# Patient Record
Sex: FEMALE | Race: WHITE | NOT HISPANIC OR LATINO | Employment: PART TIME
[De-identification: names, ages, dates, MRNs, and addresses within clinical notes are randomized per-mention and may not be internally consistent; named-entity substitution may affect disease eponyms.]

---

## 2019-12-09 ENCOUNTER — HEALTH MAINTENANCE LETTER (OUTPATIENT)
Age: 24
End: 2019-12-09

## 2020-03-15 ENCOUNTER — HEALTH MAINTENANCE LETTER (OUTPATIENT)
Age: 25
End: 2020-03-15

## 2020-08-28 LAB
C TRACH DNA SPEC QL PROBE+SIG AMP: NEGATIVE
N GONORRHOEA DNA SPEC QL PROBE+SIG AMP: NEGATIVE

## 2020-10-22 LAB
HIV 1+2 AB+HIV1 P24 AG SERPL QL IA: NEGATIVE
RUBELLA ABY IGG: NORMAL
RUBELLA ANTIBODY IGG QUANTITATIVE: 12.2 IU/ML

## 2020-12-21 LAB — HBV SURFACE AG SERPL QL IA: NORMAL

## 2021-01-14 ENCOUNTER — HEALTH MAINTENANCE LETTER (OUTPATIENT)
Age: 26
End: 2021-01-14

## 2021-02-15 ENCOUNTER — APPOINTMENT (OUTPATIENT)
Dept: ULTRASOUND IMAGING | Facility: CLINIC | Age: 26
End: 2021-02-15
Attending: OBSTETRICS & GYNECOLOGY
Payer: COMMERCIAL

## 2021-02-15 ENCOUNTER — HOSPITAL ENCOUNTER (OUTPATIENT)
Facility: CLINIC | Age: 26
Discharge: HOME | End: 2021-02-15
Attending: OBSTETRICS & GYNECOLOGY | Admitting: OBSTETRICS & GYNECOLOGY
Payer: COMMERCIAL

## 2021-02-15 VITALS
SYSTOLIC BLOOD PRESSURE: 115 MMHG | DIASTOLIC BLOOD PRESSURE: 69 MMHG | HEART RATE: 85 BPM | RESPIRATION RATE: 16 BRPM | WEIGHT: 165 LBS | TEMPERATURE: 98.4 F | BODY MASS INDEX: 29.23 KG/M2 | HEIGHT: 63 IN

## 2021-02-15 DIAGNOSIS — N20.0 NEPHROLITHIASIS: Primary | ICD-10-CM

## 2021-02-15 PROBLEM — Z36.89 ENCOUNTER FOR TRIAGE IN PREGNANT PATIENT: Status: ACTIVE | Noted: 2021-02-15

## 2021-02-15 LAB
ALBUMIN UR-MCNC: 30 MG/DL
APPEARANCE UR: ABNORMAL
BILIRUB UR QL STRIP: NEGATIVE
COLOR UR AUTO: YELLOW
GLUCOSE UR STRIP-MCNC: NEGATIVE MG/DL
HGB UR QL STRIP: ABNORMAL
KETONES UR STRIP-MCNC: NEGATIVE MG/DL
LEUKOCYTE ESTERASE UR QL STRIP: NEGATIVE
MUCOUS THREADS #/AREA URNS LPF: PRESENT /LPF
NITRATE UR QL: NEGATIVE
PH UR STRIP: 6 PH (ref 5–7)
RBC #/AREA URNS AUTO: >182 /HPF (ref 0–2)
SOURCE: ABNORMAL
SP GR UR STRIP: 1.03 (ref 1–1.03)
SQUAMOUS #/AREA URNS AUTO: 5 /HPF (ref 0–1)
UROBILINOGEN UR STRIP-MCNC: NORMAL MG/DL (ref 0–2)
WBC #/AREA URNS AUTO: 5 /HPF (ref 0–5)

## 2021-02-15 PROCEDURE — G0463 HOSPITAL OUTPT CLINIC VISIT: HCPCS

## 2021-02-15 PROCEDURE — 96375 TX/PRO/DX INJ NEW DRUG ADDON: CPT

## 2021-02-15 PROCEDURE — 258N000003 HC RX IP 258 OP 636: Performed by: OBSTETRICS & GYNECOLOGY

## 2021-02-15 PROCEDURE — 96374 THER/PROPH/DIAG INJ IV PUSH: CPT

## 2021-02-15 PROCEDURE — 96360 HYDRATION IV INFUSION INIT: CPT

## 2021-02-15 PROCEDURE — 76770 US EXAM ABDO BACK WALL COMP: CPT

## 2021-02-15 PROCEDURE — 250N000011 HC RX IP 250 OP 636: Performed by: OBSTETRICS & GYNECOLOGY

## 2021-02-15 PROCEDURE — 81001 URINALYSIS AUTO W/SCOPE: CPT | Performed by: OBSTETRICS & GYNECOLOGY

## 2021-02-15 PROCEDURE — 96376 TX/PRO/DX INJ SAME DRUG ADON: CPT

## 2021-02-15 PROCEDURE — 250N000013 HC RX MED GY IP 250 OP 250 PS 637: Performed by: OBSTETRICS & GYNECOLOGY

## 2021-02-15 RX ORDER — PROCHLORPERAZINE 25 MG
25 SUPPOSITORY, RECTAL RECTAL EVERY 12 HOURS PRN
Qty: 14 SUPPOSITORY | Refills: 0 | Status: ON HOLD | OUTPATIENT
Start: 2021-02-15 | End: 2021-05-29

## 2021-02-15 RX ORDER — NALOXONE HYDROCHLORIDE 0.4 MG/ML
0.2 INJECTION, SOLUTION INTRAMUSCULAR; INTRAVENOUS; SUBCUTANEOUS
Status: DISCONTINUED | OUTPATIENT
Start: 2021-02-15 | End: 2021-02-15 | Stop reason: HOSPADM

## 2021-02-15 RX ORDER — NALOXONE HYDROCHLORIDE 0.4 MG/ML
0.4 INJECTION, SOLUTION INTRAMUSCULAR; INTRAVENOUS; SUBCUTANEOUS
Status: DISCONTINUED | OUTPATIENT
Start: 2021-02-15 | End: 2021-02-15 | Stop reason: HOSPADM

## 2021-02-15 RX ORDER — ACETAMINOPHEN 325 MG/1
650 TABLET ORAL EVERY 4 HOURS PRN
Status: DISCONTINUED | OUTPATIENT
Start: 2021-02-15 | End: 2021-02-15 | Stop reason: HOSPADM

## 2021-02-15 RX ORDER — VITAMIN A ACETATE, BETA CAROTENE, ASCORBIC ACID, CHOLECALCIFEROL, .ALPHA.-TOCOPHEROL ACETATE, DL-, THIAMINE MONONITRATE, RIBOFLAVIN, NIACINAMIDE, PYRIDOXINE HYDROCHLORIDE, FOLIC ACID, CYANOCOBALAMIN, CALCIUM CARBONATE, FERROUS FUMARATE, ZINC OXIDE, CUPRIC OXIDE 3080; 12; 120; 400; 1; 1.84; 3; 20; 22; 920; 25; 200; 27; 10; 2 [IU]/1; UG/1; MG/1; [IU]/1; MG/1; MG/1; MG/1; MG/1; MG/1; [IU]/1; MG/1; MG/1; MG/1; MG/1; MG/1
1 TABLET, FILM COATED ORAL DAILY
COMMUNITY

## 2021-02-15 RX ORDER — DIPHENHYDRAMINE HCL 25 MG
25 CAPSULE ORAL EVERY 6 HOURS PRN
Status: DISCONTINUED | OUTPATIENT
Start: 2021-02-15 | End: 2021-02-15 | Stop reason: HOSPADM

## 2021-02-15 RX ORDER — DULOXETIN HYDROCHLORIDE 60 MG/1
60 CAPSULE, DELAYED RELEASE ORAL DAILY
COMMUNITY
Start: 2021-01-21

## 2021-02-15 RX ORDER — OXYCODONE HYDROCHLORIDE 5 MG/1
5 TABLET ORAL EVERY 4 HOURS PRN
Qty: 5 TABLET | Refills: 0 | Status: ON HOLD | OUTPATIENT
Start: 2021-02-15 | End: 2021-02-17

## 2021-02-15 RX ORDER — PROMETHAZINE HYDROCHLORIDE 12.5 MG/1
25 TABLET ORAL EVERY 6 HOURS PRN
Qty: 14 TABLET | Refills: 0 | Status: ON HOLD | OUTPATIENT
Start: 2021-02-15 | End: 2021-05-29

## 2021-02-15 RX ORDER — HYDROMORPHONE HYDROCHLORIDE 1 MG/ML
0.2 INJECTION, SOLUTION INTRAMUSCULAR; INTRAVENOUS; SUBCUTANEOUS
Status: DISCONTINUED | OUTPATIENT
Start: 2021-02-15 | End: 2021-02-15 | Stop reason: HOSPADM

## 2021-02-15 RX ORDER — ONDANSETRON 4 MG/1
8 TABLET, FILM COATED ORAL EVERY 8 HOURS PRN
Qty: 30 TABLET | Refills: 0 | Status: ON HOLD | OUTPATIENT
Start: 2021-02-15 | End: 2021-05-29

## 2021-02-15 RX ORDER — ONDANSETRON 2 MG/ML
8 INJECTION INTRAMUSCULAR; INTRAVENOUS EVERY 8 HOURS PRN
Status: DISCONTINUED | OUTPATIENT
Start: 2021-02-15 | End: 2021-02-15 | Stop reason: HOSPADM

## 2021-02-15 RX ORDER — SODIUM CHLORIDE, SODIUM LACTATE, POTASSIUM CHLORIDE, CALCIUM CHLORIDE 600; 310; 30; 20 MG/100ML; MG/100ML; MG/100ML; MG/100ML
INJECTION, SOLUTION INTRAVENOUS CONTINUOUS
Status: DISCONTINUED | OUTPATIENT
Start: 2021-02-15 | End: 2021-02-15 | Stop reason: HOSPADM

## 2021-02-15 RX ORDER — DIPHENHYDRAMINE HCL 25 MG
25 CAPSULE ORAL EVERY 6 HOURS PRN
Qty: 60 CAPSULE | Refills: 0 | Status: ON HOLD | OUTPATIENT
Start: 2021-02-15 | End: 2021-05-29

## 2021-02-15 RX ORDER — DIPHENHYDRAMINE HYDROCHLORIDE 50 MG/ML
25 INJECTION INTRAMUSCULAR; INTRAVENOUS EVERY 6 HOURS PRN
Status: DISCONTINUED | OUTPATIENT
Start: 2021-02-15 | End: 2021-02-15 | Stop reason: HOSPADM

## 2021-02-15 RX ORDER — OXYCODONE HYDROCHLORIDE 5 MG/1
5 TABLET ORAL EVERY 4 HOURS PRN
Status: DISCONTINUED | OUTPATIENT
Start: 2021-02-15 | End: 2021-02-15 | Stop reason: HOSPADM

## 2021-02-15 RX ORDER — TAMSULOSIN HYDROCHLORIDE 0.4 MG/1
0.4 CAPSULE ORAL DAILY
Qty: 7 CAPSULE | Refills: 0 | Status: SHIPPED | OUTPATIENT
Start: 2021-02-15 | End: 2021-02-22

## 2021-02-15 RX ORDER — METOCLOPRAMIDE HYDROCHLORIDE 5 MG/ML
10 INJECTION INTRAMUSCULAR; INTRAVENOUS EVERY 6 HOURS PRN
Status: DISCONTINUED | OUTPATIENT
Start: 2021-02-15 | End: 2021-02-15 | Stop reason: HOSPADM

## 2021-02-15 RX ORDER — ACETAMINOPHEN 325 MG/1
650 TABLET ORAL EVERY 4 HOURS PRN
Qty: 30 TABLET | Refills: 0 | Status: ON HOLD | OUTPATIENT
Start: 2021-02-15 | End: 2021-05-29

## 2021-02-15 RX ADMIN — ONDANSETRON 8 MG: 2 INJECTION INTRAMUSCULAR; INTRAVENOUS at 05:51

## 2021-02-15 RX ADMIN — OXYCODONE HYDROCHLORIDE 5 MG: 5 TABLET ORAL at 08:32

## 2021-02-15 RX ADMIN — ACETAMINOPHEN 650 MG: 325 TABLET, FILM COATED ORAL at 08:32

## 2021-02-15 RX ADMIN — DIPHENHYDRAMINE HYDROCHLORIDE 25 MG: 25 CAPSULE ORAL at 08:32

## 2021-02-15 RX ADMIN — METOCLOPRAMIDE HYDROCHLORIDE 10 MG: 5 INJECTION INTRAMUSCULAR; INTRAVENOUS at 08:33

## 2021-02-15 RX ADMIN — SODIUM CHLORIDE, POTASSIUM CHLORIDE, SODIUM LACTATE AND CALCIUM CHLORIDE: 600; 310; 30; 20 INJECTION, SOLUTION INTRAVENOUS at 05:52

## 2021-02-15 RX ADMIN — HYDROMORPHONE HYDROCHLORIDE 0.5 MG: 1 INJECTION, SOLUTION INTRAMUSCULAR; INTRAVENOUS; SUBCUTANEOUS at 07:39

## 2021-02-15 RX ADMIN — HYDROMORPHONE HYDROCHLORIDE 0.2 MG: 1 INJECTION, SOLUTION INTRAMUSCULAR; INTRAVENOUS; SUBCUTANEOUS at 05:51

## 2021-02-15 ASSESSMENT — MIFFLIN-ST. JEOR: SCORE: 1462.57

## 2021-02-15 NOTE — H&P
"Triage Note    CC: left flank pain  HPI Source: chart and pt    HPI: This 25 year old  at 24w4d by US presenting to triage with cc of left flank pain that started last evening at around 2100 hrs, rated 6-7/10, radiating to her left groin area. Denies persona or family hx of kidney stones. Denies vaginal bleeding, abnormal discharge, LOF, contractions. States + FM.     During her triage evaluation she had a UA showing microscopic hematuria, no UTI, she had a renal ultrasound that showed the following:    IMPRESSION:  1.  Mild to moderate right-sided hydronephrosis.  2.  Mild left-sided hydronephrosis.  3.  Nonobstructing right renal stones.    She was given dilaudid and zofran with mild relief scoring her pain this morning at 5/10. Her pain regimen was modified to Benadryl 25 mg q  Hrs, Tylenol 650  4-6 hrs and Oxycodone 5 mg for breakthrough pain. For her nausea Zofran was used and Reglan IV.     Upon my evaluation pt is sleepy, stating her pain has completely resolved and now feeling only \"sore\". We discussed pain control management and passing elaine stone as OP which she agreed on.       [facility]: [facility]  Rh: neg  GBS: too early  GTT: too early    Pregnancy complications:  - hx of MRSA, with negative swabs X 2  - Mood disorder, following psychiatry  - Hep B NI       Past Medical History:   Diagnosis Date     Depressive disorder        History reviewed. No pertinent surgical history.    Gyn hx: denies    Social History     Socioeconomic History     Marital status: Single     Spouse name: Not on file     Number of children: Not on file     Years of education: Not on file     Highest education level: Not on file   Occupational History     Not on file   Social Needs     Financial resource strain: Not on file     Food insecurity     Worry: Not on file     Inability: Not on file     Transportation needs     Medical: Not on file     Non-medical: Not on file   Tobacco Use     Smoking status: Former Smoker     " "Smokeless tobacco: Never Used   Substance and Sexual Activity     Alcohol use: Not Currently     Drug use: No     Sexual activity: Not on file   Lifestyle     Physical activity     Days per week: Not on file     Minutes per session: Not on file     Stress: Not on file   Relationships     Social connections     Talks on phone: Not on file     Gets together: Not on file     Attends Jew service: Not on file     Active member of club or organization: Not on file     Attends meetings of clubs or organizations: Not on file     Relationship status: Not on file     Intimate partner violence     Fear of current or ex partner: Not on file     Emotionally abused: Not on file     Physically abused: Not on file     Forced sexual activity: Not on file   Other Topics Concern     Not on file   Social History Narrative     Not on file        No Known Allergies    Current Facility-Administered Medications   Medication     acetaminophen (TYLENOL) tablet 650 mg     diphenhydrAMINE (BENADRYL) capsule 25 mg    Or     diphenhydrAMINE (BENADRYL) injection 25 mg     HYDROmorphone (PF) (DILAUDID) injection 0.2 mg     lactated ringers infusion     metoclopramide (REGLAN) injection 10 mg     naloxone (NARCAN) injection 0.2 mg    Or     naloxone (NARCAN) injection 0.4 mg    Or     naloxone (NARCAN) injection 0.2 mg    Or     naloxone (NARCAN) injection 0.4 mg     ondansetron (ZOFRAN) injection 8 mg     oxyCODONE (ROXICODONE) tablet 5 mg         Objective:  Vital Signs:  /69   Pulse 85   Temp 98.4  F (36.9  C) (Oral)   Resp 16   Ht 1.6 m (5' 3\")   Wt 74.8 kg (165 lb)   BMI 29.23 kg/m         Physical Examination:  Gen: A&Ox3, NAD  CV: RRR  Lungs: CTAB, no w/r/r  Abdomen: soft, gravid, NTGR  SSE:  deferred  SVE:  deferred  [place]: quiet   FHR: Baseline 120, mod variability,  + accelerations, - decelerations     IMAGING:  EXAM: US RENAL COMPLETE  LOCATION: [facility]  DATE/TIME: 2/15/2021 6:00 AM     INDICATION: Left " flank pain  COMPARISON: None.  TECHNIQUE: Routine Bilateral Renal and Bladder Ultrasound.     FINDINGS:     RIGHT KIDNEY: 11.7 cm. Mild to moderate right-sided hydronephrosis. Multiple echogenic focus largest measuring up to 0.9 cm consistent with renal stones. Cortex measures 1.1 cm.     LEFT KIDNEY: 12.5 cm. Mild left-sided pelviectasis. Cortex measures 1.2 cm     BLADDER: Normal.                                                                      IMPRESSION:  1.  Mild to moderate right-sided hydronephrosis.  2.  Mild left-sided hydronephrosis.  3.  Nonobstructing right renal stones.    Assessment/P:  Hazel Ponce is a 25 year old  at 24w4d here with left nephrolithiasis.     - thorough disccussion of proper PO hydration, passing stone at home, pain control and follow up as OP. We discussed that if at any point  labor, fever, chills, uncontrolled pain would happen she will need to come back for further evaluation.   - pain management   - Tylenol 650 q 4-6 hrs around the clock   - Benadryl 25 mg q 6 hrs around the clock   - Oxycodone 5 mg q 4 hrs ONLY for beakthrough pain  - nephrolithiasis, management   - discussed per ultrasound does not seem obstructive   - if worsening pain, fever, chills, changed in ultrasound if further evaluation then urology consult and potential stents may be necessary   - proper PO hydration, avoid carbonated drinks   - if stones is passed and pt is able to get it then she will take to lab  - Nausea and vomiting   - Zofran 8 mg q 8 hrs PO PRN   - Prochlorperazine 25 mg intrarectally q 12 hrs (in case Zofran not working)  All questions answered.   Pt will make appointment at the end of this week for follow up in clinic.       Dr. Antelmo Rangel  [phone]

## 2021-02-15 NOTE — DISCHARGE INSTRUCTIONS
Discharge Instruction for Undelivered Patients      You were seen for: Labor Assessment and Flank Pain  We Consulted: Dr Rangel  You had (Test or Medicine):IV Fluids, Renal Ultrasound      Diet:   Drink 8 to 12 glasses of liquids (milk, juice, water) every day.  You may eat meals and snacks.     Activity:  Call your doctor or nurse midwife if your baby is moving less than usual.     Call your provider if you notice:  Swelling in your face or increased swelling in your hands or legs.  Headaches that are not relieved by Tylenol (acetaminophen).  Changes in your vision (blurring: seeing spots or stars.)  Nausea (sick to your stomach) and vomiting (throwing up).   Weight gain of 5 pounds or more per week.  Heartburn that doesn't go away.  Signs of bladder infection: pain when you urinate (use the toilet), need to go more often and more urgently.  The bag of mcdonough (rupture of membranes) breaks, or you notice leaking in your underwear.  Bright red blood in your underwear.  Abdominal (lower belly) or stomach pain.  For first baby: Contractions (tightening) less than 5 minutes apart for one hour or more.  Second (plus) baby: Contractions (tightening) less than 10 minutes apart and getting stronger.  *If less than 34 weeks: Contractions (tightenings) more than 6 times in one hour.  Increase or change in vaginal discharge (note the color and amount)  Other:  meds at your pharmacy    Follow-up:  Call for appt in clinic this week

## 2021-02-15 NOTE — PLAN OF CARE
Dr Rangel at bedside POC discussed with pt. Discharge home now, follow up in clinic this week and  meds at pts pharmacy on record. Pt states clesr understanding of follow up and when to call the doctor

## 2021-02-15 NOTE — PROVIDER NOTIFICATION
02/15/21 0800   Provider Notification   Provider Name/Title Juan Carlos   Method of Notification Phone   Notification Reason Status Update   new medication orders received

## 2021-02-15 NOTE — PROVIDER NOTIFICATION
02/15/21 0530   Provider Notification   Provider Name/Title Dr. VINH Song   Method of Notification Phone;Electronic Page   Request Evaluate - Remote   Notification Reason Patient Arrived;Pain;Status Update   Dr. Song updated of patient's arrival to hospital for left flank pain that she felt started last evening around 2100 that has not improved. Has vomited x 4 since arrival and reported vomiting many times at home. Rating pain 6/10 that is constant and then has waves of increased pain that she describes as squeezing and pressure. Orders received.

## 2021-02-16 ENCOUNTER — HOSPITAL ENCOUNTER (INPATIENT)
Facility: CLINIC | Age: 26
LOS: 1 days | Discharge: HOME | End: 2021-02-17
Attending: OBSTETRICS & GYNECOLOGY | Admitting: OBSTETRICS & GYNECOLOGY
Payer: COMMERCIAL

## 2021-02-16 ENCOUNTER — APPOINTMENT (OUTPATIENT)
Dept: ULTRASOUND IMAGING | Facility: CLINIC | Age: 26
End: 2021-02-16
Attending: OBSTETRICS & GYNECOLOGY
Payer: COMMERCIAL

## 2021-02-16 DIAGNOSIS — N20.0 KIDNEY STONES: Primary | ICD-10-CM

## 2021-02-16 LAB
ALBUMIN SERPL-MCNC: 2.7 G/DL (ref 3.4–5)
ALBUMIN UR-MCNC: NEGATIVE MG/DL
ALP SERPL-CCNC: 72 U/L (ref 40–150)
ALT SERPL W P-5'-P-CCNC: 28 U/L (ref 0–50)
ANION GAP SERPL CALCULATED.3IONS-SCNC: 8 MMOL/L (ref 3–14)
APPEARANCE UR: CLEAR
AST SERPL W P-5'-P-CCNC: 15 U/L (ref 0–45)
BACTERIA #/AREA URNS HPF: ABNORMAL /HPF
BILIRUB SERPL-MCNC: 0.9 MG/DL (ref 0.2–1.3)
BILIRUB UR QL STRIP: NEGATIVE
BUN SERPL-MCNC: 9 MG/DL (ref 7–30)
CALCIUM SERPL-MCNC: 8.6 MG/DL (ref 8.5–10.1)
CHLORIDE SERPL-SCNC: 107 MMOL/L (ref 94–109)
CO2 SERPL-SCNC: 21 MMOL/L (ref 20–32)
COLOR UR AUTO: ABNORMAL
CREAT SERPL-MCNC: 0.85 MG/DL (ref 0.52–1.04)
ERYTHROCYTE [DISTWIDTH] IN BLOOD BY AUTOMATED COUNT: 13.5 % (ref 10–15)
GFR SERPL CREATININE-BSD FRML MDRD: >90 ML/MIN/{1.73_M2}
GLUCOSE SERPL-MCNC: 93 MG/DL (ref 70–99)
GLUCOSE UR STRIP-MCNC: NEGATIVE MG/DL
HCT VFR BLD AUTO: 39.2 % (ref 35–47)
HGB BLD-MCNC: 12.7 G/DL (ref 11.7–15.7)
HGB UR QL STRIP: ABNORMAL
HYALINE CASTS #/AREA URNS LPF: 1 /LPF (ref 0–2)
KETONES UR STRIP-MCNC: 10 MG/DL
LABORATORY COMMENT REPORT: NORMAL
LEUKOCYTE ESTERASE UR QL STRIP: NEGATIVE
MCH RBC QN AUTO: 28.9 PG (ref 26.5–33)
MCHC RBC AUTO-ENTMCNC: 32.4 G/DL (ref 31.5–36.5)
MCV RBC AUTO: 89 FL (ref 78–100)
MUCOUS THREADS #/AREA URNS LPF: PRESENT /LPF
NITRATE UR QL: NEGATIVE
PH UR STRIP: 6 PH (ref 5–7)
PLATELET # BLD AUTO: 315 10E9/L (ref 150–450)
POTASSIUM SERPL-SCNC: 3.7 MMOL/L (ref 3.4–5.3)
PROT SERPL-MCNC: 7.2 G/DL (ref 6.8–8.8)
RBC # BLD AUTO: 4.4 10E12/L (ref 3.8–5.2)
RBC #/AREA URNS AUTO: 2 /HPF (ref 0–2)
SARS-COV-2 RNA RESP QL NAA+PROBE: NEGATIVE
SODIUM SERPL-SCNC: 136 MMOL/L (ref 133–144)
SOURCE: ABNORMAL
SP GR UR STRIP: 1.01 (ref 1–1.03)
SPECIMEN SOURCE: NORMAL
SQUAMOUS #/AREA URNS AUTO: 2 /HPF (ref 0–1)
UROBILINOGEN UR STRIP-MCNC: NORMAL MG/DL (ref 0–2)
WBC # BLD AUTO: 24.1 10E9/L (ref 4–11)
WBC #/AREA URNS AUTO: 3 /HPF (ref 0–5)

## 2021-02-16 PROCEDURE — G0463 HOSPITAL OUTPT CLINIC VISIT: HCPCS | Mod: 25

## 2021-02-16 PROCEDURE — G0463 HOSPITAL OUTPT CLINIC VISIT: HCPCS

## 2021-02-16 PROCEDURE — 87635 SARS-COV-2 COVID-19 AMP PRB: CPT | Performed by: OBSTETRICS & GYNECOLOGY

## 2021-02-16 PROCEDURE — 120N000001 HC R&B MED SURG/OB

## 2021-02-16 PROCEDURE — 36415 COLL VENOUS BLD VENIPUNCTURE: CPT | Performed by: OBSTETRICS & GYNECOLOGY

## 2021-02-16 PROCEDURE — 76770 US EXAM ABDO BACK WALL COMP: CPT

## 2021-02-16 PROCEDURE — 80053 COMPREHEN METABOLIC PANEL: CPT | Performed by: OBSTETRICS & GYNECOLOGY

## 2021-02-16 PROCEDURE — 85027 COMPLETE CBC AUTOMATED: CPT | Performed by: OBSTETRICS & GYNECOLOGY

## 2021-02-16 PROCEDURE — 258N000003 HC RX IP 258 OP 636: Performed by: OBSTETRICS & GYNECOLOGY

## 2021-02-16 PROCEDURE — 99221 1ST HOSP IP/OBS SF/LOW 40: CPT | Performed by: UROLOGY

## 2021-02-16 PROCEDURE — 81001 URINALYSIS AUTO W/SCOPE: CPT | Performed by: OBSTETRICS & GYNECOLOGY

## 2021-02-16 PROCEDURE — 250N000013 HC RX MED GY IP 250 OP 250 PS 637: Performed by: OBSTETRICS & GYNECOLOGY

## 2021-02-16 RX ORDER — SODIUM CHLORIDE, SODIUM LACTATE, POTASSIUM CHLORIDE, CALCIUM CHLORIDE 600; 310; 30; 20 MG/100ML; MG/100ML; MG/100ML; MG/100ML
INJECTION, SOLUTION INTRAVENOUS CONTINUOUS
Status: DISCONTINUED | OUTPATIENT
Start: 2021-02-17 | End: 2021-02-17 | Stop reason: HOSPADM

## 2021-02-16 RX ORDER — NALOXONE HYDROCHLORIDE 0.4 MG/ML
0.4 INJECTION, SOLUTION INTRAMUSCULAR; INTRAVENOUS; SUBCUTANEOUS
Status: DISCONTINUED | OUTPATIENT
Start: 2021-02-16 | End: 2021-02-17 | Stop reason: HOSPADM

## 2021-02-16 RX ORDER — DIPHENHYDRAMINE HYDROCHLORIDE 50 MG/ML
25 INJECTION INTRAMUSCULAR; INTRAVENOUS EVERY 6 HOURS PRN
Status: DISCONTINUED | OUTPATIENT
Start: 2021-02-16 | End: 2021-02-17 | Stop reason: HOSPADM

## 2021-02-16 RX ORDER — ONDANSETRON 2 MG/ML
4 INJECTION INTRAMUSCULAR; INTRAVENOUS EVERY 6 HOURS PRN
Status: DISCONTINUED | OUTPATIENT
Start: 2021-02-16 | End: 2021-02-17 | Stop reason: HOSPADM

## 2021-02-16 RX ORDER — PRENATAL VIT/IRON FUM/FOLIC AC 27MG-0.8MG
1 TABLET ORAL DAILY
Status: DISCONTINUED | OUTPATIENT
Start: 2021-02-17 | End: 2021-02-17 | Stop reason: HOSPADM

## 2021-02-16 RX ORDER — ACETAMINOPHEN 325 MG/1
975 TABLET ORAL EVERY 4 HOURS PRN
Status: DISCONTINUED | OUTPATIENT
Start: 2021-02-16 | End: 2021-02-16

## 2021-02-16 RX ORDER — NALOXONE HYDROCHLORIDE 0.4 MG/ML
0.2 INJECTION, SOLUTION INTRAMUSCULAR; INTRAVENOUS; SUBCUTANEOUS
Status: DISCONTINUED | OUTPATIENT
Start: 2021-02-16 | End: 2021-02-17 | Stop reason: HOSPADM

## 2021-02-16 RX ORDER — DULOXETIN HYDROCHLORIDE 60 MG/1
60 CAPSULE, DELAYED RELEASE ORAL DAILY
Status: DISCONTINUED | OUTPATIENT
Start: 2021-02-16 | End: 2021-02-17 | Stop reason: HOSPADM

## 2021-02-16 RX ORDER — DIPHENHYDRAMINE HCL 25 MG
25 CAPSULE ORAL EVERY 6 HOURS PRN
Status: DISCONTINUED | OUTPATIENT
Start: 2021-02-16 | End: 2021-02-17 | Stop reason: HOSPADM

## 2021-02-16 RX ORDER — OXYCODONE HYDROCHLORIDE 5 MG/1
5 TABLET ORAL EVERY 4 HOURS PRN
Status: DISCONTINUED | OUTPATIENT
Start: 2021-02-16 | End: 2021-02-17 | Stop reason: HOSPADM

## 2021-02-16 RX ORDER — OXYCODONE HYDROCHLORIDE 5 MG/1
5 TABLET ORAL EVERY 4 HOURS PRN
Status: DISCONTINUED | OUTPATIENT
Start: 2021-02-16 | End: 2021-02-16

## 2021-02-16 RX ORDER — ACETAMINOPHEN 325 MG/1
650 TABLET ORAL EVERY 4 HOURS PRN
Status: DISCONTINUED | OUTPATIENT
Start: 2021-02-16 | End: 2021-02-17 | Stop reason: HOSPADM

## 2021-02-16 RX ADMIN — OXYCODONE HYDROCHLORIDE 5 MG: 5 TABLET ORAL at 19:06

## 2021-02-16 RX ADMIN — SODIUM CHLORIDE, POTASSIUM CHLORIDE, SODIUM LACTATE AND CALCIUM CHLORIDE: 600; 310; 30; 20 INJECTION, SOLUTION INTRAVENOUS at 23:56

## 2021-02-16 RX ADMIN — OXYCODONE HYDROCHLORIDE 5 MG: 5 TABLET ORAL at 15:18

## 2021-02-16 RX ADMIN — SODIUM CHLORIDE, POTASSIUM CHLORIDE, SODIUM LACTATE AND CALCIUM CHLORIDE 500 ML: 600; 310; 30; 20 INJECTION, SOLUTION INTRAVENOUS at 14:45

## 2021-02-16 RX ADMIN — OXYCODONE HYDROCHLORIDE 5 MG: 5 TABLET ORAL at 23:04

## 2021-02-16 RX ADMIN — ACETAMINOPHEN 650 MG: 325 TABLET, FILM COATED ORAL at 19:05

## 2021-02-16 RX ADMIN — ACETAMINOPHEN 650 MG: 325 TABLET, FILM COATED ORAL at 23:04

## 2021-02-16 RX ADMIN — ACETAMINOPHEN 650 MG: 325 TABLET, FILM COATED ORAL at 15:18

## 2021-02-16 NOTE — CONSULTS
Hazel is a 25-year-old female admitted yesterday with left-sided back pain that radiated to the left lower quadrant anteriorly.  An ultrasound of the kidney showed mild left hydronephrosis and right hydronephrosis with echogenic foci consistent with renal stones.  Today she has had a little more discomfort, requiring narcotic medication.  For pain has been in the lower left back area and she has had no real pain on her right side.  She is a little more comfortable lying down and sitting up.  The patient has no history of stones.  Her father thinks her grandmother had urolithiasis.  The patient has had a creatinine of 0.85-0.77 several years ago, and elevated white count today and a normal serum calcium level.  Her urinalysis on admission showed a pH of 6.0, specific gravity of 1.027 and large blood but no evidence for infection.  She has been afebrile  Other past medical history: First pregnancy-24 weeks gestation, mood disorder, history of MRSA last year  Medications: Tylenol, oxycodone  Allergies: None  Exam: I met with the patient and she is alert and oriented.  Her nurse and her father in the room.  The patient is resting quietly on her left side and appears to be in no distress.  Vital signs are stable  Assessment: Left-sided low back pain with physiologic hydro on ultrasound.  Right renal calculi suspected on ultrasound.  Discussed possible stent if her discomfort worsens during this pregnancy or this admission, alternatives, risks and need for imaging after she delivers her baby.  Elevated white count is probably related to stress-no sign of infection.  Plan: N.p.o. after midnight.  We will ask my partner to see her in the morning

## 2021-02-16 NOTE — PROVIDER NOTIFICATION
02/16/21 1331   Provider Notification   Provider Name/Title Dr. Garcia   Method of Notification At Bedside   Request Evaluate in Person   Dr. Garcia at bedside to evaluate patient and discuss plan of care. Plan per provider is to obtain labs, UA, repeat renal ultrasound, and administer IV bolus after ultrasound. MD updated on patient pain 10/10 when up moving, minimal relief with PO oxycodone and tylenol, nausea and vomiting since last evening.  Will update MD with results when available.

## 2021-02-16 NOTE — PROVIDER NOTIFICATION
02/16/21 1640   Provider Notification   Provider Name/Title Dr. Garcia   Method of Notification Phone   Request Evaluate - Remote   Notification Reason Status Update     MD called for status update.  Urologist would like the patient to stay overnight for pain control and monitoring.  His partner will be here in the morning to check on the patient and determine a new plan of care if necessary.  MD is agreeable to the plan of care.  Patient can eat a regular diet until midnight then she needs to be NPO with IV fluids at 125 ml/h.  She can have oxycodone and tylenol PRN q4 hours.  MD would also like qshift doppler FHT.  Patient is agreeable to the plan of care and states that her pain is more controlled since her tylenol and oxycodone.

## 2021-02-16 NOTE — PROVIDER NOTIFICATION
02/16/21 1518   Provider Notification   Provider Name/Title Dr. Garcia   Method of Notification At Bedside   Request Evaluate in Person   Notification Reason Status Update     MD at bedside discussing the plan of care with the patient.  Patient is still experiencing 10/10 pain that is better when she is sitting up and worse when she is laying down.  MD ordered 5 mg of oxycodone and 650 mg of tylenol and the patient took them.  She has received her 500 ml bolus.  MD put in for a urology consult regarding renal ultrasound and is hoping to hear back soon. MD okay with patient having a small snack of jello while waiting for consult.

## 2021-02-16 NOTE — PLAN OF CARE
Data: [facility] to [facility]: 2021  1:14 PM.  Reason for maternal/fetal assessment is patient was seen here yesterday with left flank pain and ultrasound revealed multiple kidney stones on the right.  Patient was sent home with PO oxycodone.  Over night her pain has increased and is not relieved with oxycodone and tylenol.  Patient rating pain 10/10 on left flank when up moving.  Patient states pain is more intense and localized in left groin and wraps around to her back. Abdomen tender to palpation. Patient reports new onset nausea and vomiting and has been unable to keep food down today.  Patient is a .  Prenatal record reviewed. Pregnancy has been uncomplicated..  Gestational Age 24w5d. VSS. Fetal movement present. Patient denies uterine contractions, leaking of vaginal fluid/rupture of membranes, vaginal bleeding, pelvic pressure, headache, visual disturbances, epigastric or URQ pain, significant edema. Support person is present, patient brandie Gilliland at bedside.   Action: Verbal consent for EFM. Triage assessment completed. Bill of rights reviewed.  Response: Patient verbalized agreement with plan. Will contact Dr Abigail Garcia with update and further orders.

## 2021-02-16 NOTE — PROGRESS NOTES
Reviewed lab results and US prelim result.  WBC elevated at 24.1, Creatinine elevated (by pregnancy standards) at 0.85, and US did not show right ureteral jet (noted on written transcript available on PACS).  Urology consult placed. Afebrile and VS normal.

## 2021-02-17 ENCOUNTER — HOSPITAL ENCOUNTER (INPATIENT)
Facility: CLINIC | Age: 26
End: 2021-02-17
Admitting: OBSTETRICS & GYNECOLOGY
Payer: COMMERCIAL

## 2021-02-17 VITALS — SYSTOLIC BLOOD PRESSURE: 104 MMHG | TEMPERATURE: 98.5 F | DIASTOLIC BLOOD PRESSURE: 57 MMHG | RESPIRATION RATE: 18 BRPM

## 2021-02-17 PROCEDURE — 250N000013 HC RX MED GY IP 250 OP 250 PS 637: Performed by: OBSTETRICS & GYNECOLOGY

## 2021-02-17 PROCEDURE — 99231 SBSQ HOSP IP/OBS SF/LOW 25: CPT | Performed by: UROLOGY

## 2021-02-17 RX ORDER — OXYCODONE HYDROCHLORIDE 5 MG/1
5 TABLET ORAL EVERY 6 HOURS PRN
Qty: 20 TABLET | Refills: 0 | Status: SHIPPED | OUTPATIENT
Start: 2021-02-17 | End: 2021-02-27

## 2021-02-17 RX ADMIN — DULOXETINE HYDROCHLORIDE 60 MG: 60 CAPSULE, DELAYED RELEASE ORAL at 09:08

## 2021-02-17 RX ADMIN — ACETAMINOPHEN 650 MG: 325 TABLET, FILM COATED ORAL at 07:33

## 2021-02-17 RX ADMIN — ACETAMINOPHEN 650 MG: 325 TABLET, FILM COATED ORAL at 03:36

## 2021-02-17 RX ADMIN — OXYCODONE HYDROCHLORIDE 5 MG: 5 TABLET ORAL at 03:36

## 2021-02-17 RX ADMIN — OXYCODONE HYDROCHLORIDE 5 MG: 5 TABLET ORAL at 07:33

## 2021-02-17 NOTE — PROVIDER NOTIFICATION
02/17/21 0800   Provider Notification   Provider Name/Title Dr. VINH Song   Method of Notification At Bedside   Request Evaluate in Person   Notification Reason Status Update     MD at bedside discussing discharge plans with the patient.  She is feeling better today.  Pain has been better controlled with tylenol and oxycodone throughout the night.  VSS.  Fetal doppler heart tones 150 this AM. The urologist was at the bedside at 0740 and is okay with the patient discharging to home since her pain is better.  He stressed to her that if the pain starts to get worse again, she needs to be seen.  Patient verbalized understanding with the urologist and is agreeable to the plan.  MD would like the patient to continue to strain her urine and if she passes a stone to bring it in for pathology.  Will discharge patient to home, undelivered later this morning, after she is able to eat breakfast.

## 2021-02-17 NOTE — DISCHARGE INSTRUCTIONS
Discharge Instruction for Undelivered Patients      You were seen for: flank pain.  We Consulted: Dr. Garcia and Dr. Dylan Song, and Urologists Dr. Hernandez and Dr. Bhardwaj  You had (Test or Medicine): Vital sign check, doppler fetal heart tones, renal ultrasound, urinalysis.     Diet:   Drink 8 to 12 glasses of liquids (milk, juice, water) every day.  You may eat meals and snacks.     Activity:  Count fetal kicks everyday (see handout)  Call your doctor or nurse midwife if your baby is moving less than usual.     Call your provider if you notice:  Swelling in your face or increased swelling in your hands or legs.  Headaches that are not relieved by Tylenol (acetaminophen).  Changes in your vision (blurring: seeing spots or stars.)  Nausea (sick to your stomach) and vomiting (throwing up).   Weight gain of 5 pounds or more per week.  Heartburn that doesn't go away.  Signs of bladder infection: pain when you urinate (use the toilet), need to go more often and more urgently.  The bag of mcdonough (rupture of membranes) breaks, or you notice leaking in your underwear.  Bright red blood in your underwear.  Abdominal (lower belly) or stomach pain.  For first baby: Contractions (tightening) less than 5 minutes apart for one hour or more.  Second (plus) baby: Contractions (tightening) less than 10 minutes apart and getting stronger.  *If less than 34 weeks: Contractions (tightenings) more than 6 times in one hour.  Increase or change in vaginal discharge (note the color and amount)  Other: Call your doctor or midwife with any questions or concerns.     Follow-up:  As scheduled in the clinic

## 2021-02-17 NOTE — PROGRESS NOTES
Met with Hazel and chart reviewed.  Pt is a 25 year old P0 at 24+6 presenting with right renal stone and now seen by urology today.  Plan per urology is discharge provided patient is comfortable and tolerating food.    ROS neg for bleeding, farrah, dysuria, LOF    States pain is well managed with one oxycodone and tyl.    Baby FHT normal    Mom appears pleasant and desires discharge.  Will send home with oxy, and knows to strain urine and save any stone  Will discharge and follow up PRN

## 2021-02-17 NOTE — DISCHARGE SUMMARY
[facility] Discharge Summary    Hazel Ponce MRN# [id number]   Age: 25 year old YOB: 1995     Date of Admission:  2/16/2021  Date of Discharge::  2/17/2021  Admitting Physician:  Abigail Garcia MD  Discharge Physician:  Dylan Song     [place] [facility]: [facility]          Admission Diagnoses:   Encounter for triage in pregnant patient [Z36.89]  Kidney stones [N20.0]          Discharge Diagnosis:   Encounter for triage in pregnant patient [Z36.89]  Kidney stones [N20.0]            Procedures:   Procedure(s): none                  Medications Prior to Admission:     Medications Prior to Admission   Medication Sig Dispense Refill Last Dose     acetaminophen (TYLENOL) 325 MG tablet Take 2 tablets (650 mg) by mouth every 4 hours as needed for mild pain or fever 30 tablet 0 2/16/2021 at 1230     amoxicillin-clavulanate (AUGMENTIN) 875-125 MG per tablet Take 1 tablet by mouth 2 times daily   2/16/2021 at 0800     cholecalciferol 50 MCG (2000 UT) CAPS Take 2,000 Units by mouth daily   2/16/2021 at 0800     diphenhydrAMINE (BENADRYL) 25 MG capsule Take 1 capsule (25 mg) by mouth every 6 hours as needed for other (pain) 60 capsule 0 2/16/2021 at 0800     DULoxetine (CYMBALTA) 60 MG capsule Take 60 mg by mouth daily   Past Week at Unknown time     ondansetron (ZOFRAN) 4 MG tablet Take 2 tablets (8 mg) by mouth every 8 hours as needed for nausea or vomiting 30 tablet 0 2/16/2021 at 0800     Prenatal Vit-Fe Fumarate-FA (PRENATAL PLUS) 27-1 MG TABS Take 1 tablet by mouth daily   Past Week at Unknown time     prochlorperazine (COMPAZINE) 25 MG suppository Place 1 suppository (25 mg) rectally every 12 hours as needed for nausea or vomiting 14 suppository 0 2/16/2021 at 0800     promethazine (PHENERGAN) 12.5 MG tablet Take 2 tablets (25 mg) by mouth every 6 hours as needed for nausea 14 tablet 0 2/16/2021 at 0800     tamsulosin (FLOMAX) 0.4 MG capsule Take 1 capsule (0.4 mg) by mouth daily for  7 days 7 capsule 0 Past Week at Unknown time     [DISCONTINUED] oxyCODONE (ROXICODONE) 5 MG tablet Take 1 tablet (5 mg) by mouth every 4 hours as needed for severe pain 5 tablet 0 2021 at 1230             Discharge Medications:     Current Facility-Administered Medications   Medication     acetaminophen (TYLENOL) tablet 650 mg     diphenhydrAMINE (BENADRYL) capsule 25 mg    Or     diphenhydrAMINE (BENADRYL) injection 25 mg     DULoxetine (CYMBALTA) DR capsule 60 mg     lactated ringers infusion     naloxone (NARCAN) injection 0.2 mg    Or     naloxone (NARCAN) injection 0.4 mg    Or     naloxone (NARCAN) injection 0.2 mg    Or     naloxone (NARCAN) injection 0.4 mg     No Tdap Needed - Assessment: Patient does not need Tdap vaccine     ondansetron (ZOFRAN) injection 4 mg     oxyCODONE (ROXICODONE) tablet 5 mg     prenatal multivitamin w/iron per tablet 1 tablet     sodium chloride (PF) 0.9% PF flush 3 mL     sodium chloride (PF) 0.9% PF flush 3 mL     Discharge script includes Oxycodone only          Consultations:   Urology consultations were requested during this admission          Brief History of Labor or Admission:   Hazel Ponce is a 25 year old  female with ADILENE: 6/3/2021, by Patient Reported,  Gestational Age: 24w5d who presents for worsening pain in setting of known nephrolithiasis.  She was diagnosed with right sided non-obstructing kidney stones yesterday.  Her pain is mostly along LLQ and left flank.  Pain initially started about 2 days ago; and has been worse since; pain is now mostly LLQ instead of left flank.  Pain is not relieved by oxycodone + tylenol that was prescribed yesterday.  Denies fevers, chills, hematuria, dysuria.  She does report vomiting after dinner yesterday, which she has not done throughout pregnancy.  She reports mild baseline nausea, but this is not a new symptom.     Patient denies leaking of fluid or vaginal bleeding.  Fetal Movement: present.                Hospital Course:   See urology consult note.    Pain has been managed with tyl and oxycodone.  IVF given and pt tolerating orals        Post-partum hemoglobin:   Hemoglobin   Date Value Ref Range Status   02/16/2021 12.7 11.7 - 15.7 g/dL Final             Discharge Instructions and Follow-Up:   Discharge diet: Regular   Discharge activity: Activity as tolerated   Discharge follow-up: Follow up with primary care provider in 1 week   Wound care: Drink plenty of fluids           Discharge Disposition:   Discharged to home      Attestation:  I have reviewed today's vital signs, notes, medications, labs and imaging.    Dylan Song

## 2021-02-17 NOTE — PROGRESS NOTES
Urology    Patient reports feeling better  Afebrile and VSS    A/P: No urologic intervention required  OK to discharge home  I recommended to the patient that she have a noncontrast CT scan after delivery in order to look for the presence of any kidney stones

## 2021-02-17 NOTE — PLAN OF CARE
Data: Patient assessed in the [facility] for flank pain.  Cervical exam not examined.  Membranes intact.  Contractions none.  Action:  Fetal doppler heart tones every shift. Discharge instructions reviewed.  Patient instructed to report change in fetal movement, vaginal leaking of fluid or bleeding, abdominal pain, or any concerns related to the pregnancy to her nurse/physician. She understands that she needs to come back to the hospital to be evaluated if her pain gets any worse and is not relieved by tylenol and oxycodone.  She is also to strain her urine and make a follow-up appointment next week.   Response: Orders to discharge home per Dylan Song.  Patient verbalized understanding of education and verbalized agreement with plan. Discharged to home at 1035.

## 2021-02-17 NOTE — PLAN OF CARE
Patient rested well overnight, VSS. Pain well controlled with PRN oxycodone and tylenol. IV LR infusing @ 125ml/hr since midnight. Continue plan of care.

## 2021-04-23 ENCOUNTER — HOSPITAL ENCOUNTER (OUTPATIENT)
Facility: CLINIC | Age: 26
End: 2021-04-23
Admitting: OBSTETRICS & GYNECOLOGY
Payer: COMMERCIAL

## 2021-04-23 ENCOUNTER — HOSPITAL ENCOUNTER (OUTPATIENT)
Facility: CLINIC | Age: 26
Discharge: HOME | End: 2021-04-23
Attending: OBSTETRICS & GYNECOLOGY | Admitting: OBSTETRICS & GYNECOLOGY
Payer: COMMERCIAL

## 2021-04-23 VITALS
HEART RATE: 123 BPM | TEMPERATURE: 98.1 F | SYSTOLIC BLOOD PRESSURE: 132 MMHG | RESPIRATION RATE: 18 BRPM | DIASTOLIC BLOOD PRESSURE: 71 MMHG

## 2021-04-23 DIAGNOSIS — N20.0 NEPHROLITHIASIS: Primary | ICD-10-CM

## 2021-04-23 PROCEDURE — 250N000013 HC RX MED GY IP 250 OP 250 PS 637: Performed by: OBSTETRICS & GYNECOLOGY

## 2021-04-23 PROCEDURE — G0463 HOSPITAL OUTPT CLINIC VISIT: HCPCS

## 2021-04-23 RX ORDER — TAMSULOSIN HYDROCHLORIDE 0.4 MG/1
0.4 CAPSULE ORAL DAILY
Qty: 30 CAPSULE | Refills: 1 | Status: ON HOLD | OUTPATIENT
Start: 2021-04-23 | End: 2021-05-29

## 2021-04-23 RX ORDER — HYDROXYZINE HYDROCHLORIDE 25 MG/1
25 TABLET, FILM COATED ORAL ONCE
Status: COMPLETED | OUTPATIENT
Start: 2021-04-23 | End: 2021-04-23

## 2021-04-23 RX ORDER — ACETAMINOPHEN 325 MG/1
975 TABLET ORAL ONCE
Status: COMPLETED | OUTPATIENT
Start: 2021-04-23 | End: 2021-04-23

## 2021-04-23 RX ADMIN — ACETAMINOPHEN 975 MG: 325 TABLET, FILM COATED ORAL at 03:34

## 2021-04-23 RX ADMIN — HYDROXYZINE HYDROCHLORIDE 25 MG: 25 TABLET, FILM COATED ORAL at 03:34

## 2021-04-23 NOTE — DISCHARGE INSTRUCTIONS
Discharge Instruction for Undelivered Patients      You were seen for: Flank pain  We Consulted: Dr. Jory Rangel  You had (Test or Medicine): Electronic fetal monitoring, 975mg tylenol PO, 25mg visteril PO     Diet:   Drink 8 to 12 glasses of liquids (milk, juice, water) every day.  You may eat meals and snacks.     Activity:  Call your doctor or nurse midwife if your baby is moving less than usual.     Call your provider if you notice:  Swelling in your face or increased swelling in your hands or legs.  Headaches that are not relieved by Tylenol (acetaminophen).  Changes in your vision (blurring: seeing spots or stars.)  Nausea (sick to your stomach) and vomiting (throwing up).   Weight gain of 5 pounds or more per week.  Heartburn that doesn't go away.  Signs of bladder infection: pain when you urinate (use the toilet), need to go more often and more urgently.  The bag of mcdonough (rupture of membranes) breaks, or you notice leaking in your underwear.  Bright red blood in your underwear.  Abdominal (lower belly) or stomach pain.  For first baby: Contractions (tightening) less than 5 minutes apart for one hour or more.  *If less than 34 weeks: Contractions (tightenings) more than 6 times in one hour.  Increase or change in vaginal discharge (note the color and amount)    Follow-up:    Make appointment to be seen in clinic on Thursday 4/29/2021

## 2021-04-23 NOTE — PLAN OF CARE
Data: Patient presented to [facility]: 2021  2:22 AM.  Reason for maternal/fetal assessment is flank pain.   Patient reports waking up yesterday  to mild left flank pain, she took tylenol states the pain decreased. Pt states following her apt in clinic that morning her pain began to increase to an intermittent sharp stabbing sensation. Pt then worked a long shift on her feet and felt very nauseous when she got home, could not sleep and vomited twice at 0100 today. The pain continuous to be an intermittent stabbing, pt rates the pain 8/10 when it happens. Patient is a .  Prenatal record reviewed. Pregnancy  has been complicated by renal stones.  Gestational Age 34w1d. VSS. Fetal movement present. Patient denies vaginal bleeding, abdominal pain, pelvic pressure, nausea, vomiting, headache, visual disturbances, epigastric or URQ pain, significant edema, uterine contractions. Support person is not present.   Action: Verbal consent for EFM. Triage assessment completed. Bill of rights reviewed.  Response: Patient verbalized agreement with plan. Will contact Dr Jory Rangel with update and further orders.

## 2021-04-23 NOTE — PLAN OF CARE
Data: Patient assessed in the [facility] for flank pain.  Cervical exam not examined.  Membranes intact.  None contractions.  Action:  Presumed adequate fetal oxygenation documented (see flow record). Discharge instructions reviewed.  Patient instructed to report change in fetal movement, vaginal leaking of fluid or bleeding, abdominal pain, or any concerns related to the pregnancy to her nurse/physician.    Response: Orders to discharge home per Jory Rangel.  Patient verbalized understanding of education and verbalized agreement with plan. Discharged to home at 0335.

## 2021-04-23 NOTE — PROVIDER NOTIFICATION
04/23/21 0257   Provider Notification   Provider Name/Title Dr. Rangel   Method of Notification In Department   Request Evaluate - Remote   Notification Reason Pain   MD updated on pt arrival. OB hx and complaints reviewed. Reviewed U/A results from clinic yesterday. FHR cat 1. Pt denies ctx, none noted on toco. VSS, assessment WDL. Pt is currently not in any pain but rates the pain 8/10 when it happens.    VORB to give pt 975mg tylenol PO and 25mg visteril PO. MD will rx 0.4mg flomax PO for pt to  at pharmacy. Will encourage pt to continue drinking lots of fluids and utilizing tylenol for pain. Discharge to home and follow up in clinic in 5 days. Pt verbalized understanding and is agreeable to plan, all questions answered.    Water and juice provided per pt request and ERP orders.

## 2021-05-06 LAB — GROUP B STREP PCR: NEGATIVE

## 2021-05-28 ENCOUNTER — ANESTHESIA EVENT (OUTPATIENT)
Dept: OBGYN | Facility: CLINIC | Age: 26
End: 2021-05-28
Payer: COMMERCIAL

## 2021-05-28 ENCOUNTER — ANESTHESIA (OUTPATIENT)
Dept: OBGYN | Facility: CLINIC | Age: 26
End: 2021-05-28
Payer: COMMERCIAL

## 2021-05-28 ENCOUNTER — HOSPITAL ENCOUNTER (INPATIENT)
Facility: CLINIC | Age: 26
LOS: 2 days | Discharge: HOME | End: 2021-05-30
Attending: STUDENT IN AN ORGANIZED HEALTH CARE EDUCATION/TRAINING PROGRAM | Admitting: OBSTETRICS & GYNECOLOGY
Payer: COMMERCIAL

## 2021-05-28 DIAGNOSIS — Z37.9 VACUUM-ASSISTED VAGINAL DELIVERY: Primary | ICD-10-CM

## 2021-05-28 LAB
ABO + RH BLD: ABNORMAL
ABO + RH BLD: ABNORMAL
AMPHETAMINES UR QL SCN: NEGATIVE
BLD GP AB INVEST PLASRBC-IMP: ABNORMAL
BLD GP AB SCN SERPL QL: ABNORMAL
BLOOD BANK CMNT PATIENT-IMP: ABNORMAL
CANNABINOIDS UR QL: NEGATIVE
COCAINE UR QL: NEGATIVE
HGB BLD-MCNC: 12.3 G/DL (ref 11.7–15.7)
LABORATORY COMMENT REPORT: NORMAL
OPIATES UR QL SCN: NEGATIVE
PCP UR QL SCN: NEGATIVE
RUPTURE OF FETAL MEMBRANES BY ROM PLUS: POSITIVE
SARS-COV-2 RNA RESP QL NAA+PROBE: NEGATIVE
SPECIMEN EXP DATE BLD: ABNORMAL
SPECIMEN SOURCE: NORMAL
T PALLIDUM AB SER QL: NONREACTIVE

## 2021-05-28 PROCEDURE — 258N000003 HC RX IP 258 OP 636: Performed by: OBSTETRICS & GYNECOLOGY

## 2021-05-28 PROCEDURE — 86850 RBC ANTIBODY SCREEN: CPT | Performed by: STUDENT IN AN ORGANIZED HEALTH CARE EDUCATION/TRAINING PROGRAM

## 2021-05-28 PROCEDURE — 84112 EVAL AMNIOTIC FLUID PROTEIN: CPT | Performed by: STUDENT IN AN ORGANIZED HEALTH CARE EDUCATION/TRAINING PROGRAM

## 2021-05-28 PROCEDURE — 85018 HEMOGLOBIN: CPT | Performed by: STUDENT IN AN ORGANIZED HEALTH CARE EDUCATION/TRAINING PROGRAM

## 2021-05-28 PROCEDURE — 87635 SARS-COV-2 COVID-19 AMP PRB: CPT | Performed by: STUDENT IN AN ORGANIZED HEALTH CARE EDUCATION/TRAINING PROGRAM

## 2021-05-28 PROCEDURE — 0UQMXZZ REPAIR VULVA, EXTERNAL APPROACH: ICD-10-PCS | Performed by: OBSTETRICS & GYNECOLOGY

## 2021-05-28 PROCEDURE — 86780 TREPONEMA PALLIDUM: CPT | Performed by: STUDENT IN AN ORGANIZED HEALTH CARE EDUCATION/TRAINING PROGRAM

## 2021-05-28 PROCEDURE — 250N000013 HC RX MED GY IP 250 OP 250 PS 637: Performed by: OBSTETRICS & GYNECOLOGY

## 2021-05-28 PROCEDURE — 0UQGXZZ REPAIR VAGINA, EXTERNAL APPROACH: ICD-10-PCS | Performed by: OBSTETRICS & GYNECOLOGY

## 2021-05-28 PROCEDURE — 86870 RBC ANTIBODY IDENTIFICATION: CPT | Performed by: STUDENT IN AN ORGANIZED HEALTH CARE EDUCATION/TRAINING PROGRAM

## 2021-05-28 PROCEDURE — 370N000003 HC ANESTHESIA WARD SERVICE

## 2021-05-28 PROCEDURE — 120N000001 HC R&B MED SURG/OB

## 2021-05-28 PROCEDURE — 250N000009 HC RX 250: Performed by: OBSTETRICS & GYNECOLOGY

## 2021-05-28 PROCEDURE — 250N000009 HC RX 250: Performed by: STUDENT IN AN ORGANIZED HEALTH CARE EDUCATION/TRAINING PROGRAM

## 2021-05-28 PROCEDURE — 00HU33Z INSERTION OF INFUSION DEVICE INTO SPINAL CANAL, PERCUTANEOUS APPROACH: ICD-10-PCS | Performed by: ANESTHESIOLOGY

## 2021-05-28 PROCEDURE — 3E0R3BZ INTRODUCTION OF ANESTHETIC AGENT INTO SPINAL CANAL, PERCUTANEOUS APPROACH: ICD-10-PCS | Performed by: ANESTHESIOLOGY

## 2021-05-28 PROCEDURE — 86901 BLOOD TYPING SEROLOGIC RH(D): CPT | Performed by: STUDENT IN AN ORGANIZED HEALTH CARE EDUCATION/TRAINING PROGRAM

## 2021-05-28 PROCEDURE — 258N000003 HC RX IP 258 OP 636: Performed by: STUDENT IN AN ORGANIZED HEALTH CARE EDUCATION/TRAINING PROGRAM

## 2021-05-28 PROCEDURE — 80307 DRUG TEST PRSMV CHEM ANLYZR: CPT | Performed by: STUDENT IN AN ORGANIZED HEALTH CARE EDUCATION/TRAINING PROGRAM

## 2021-05-28 PROCEDURE — G0463 HOSPITAL OUTPT CLINIC VISIT: HCPCS

## 2021-05-28 PROCEDURE — 250N000011 HC RX IP 250 OP 636: Performed by: STUDENT IN AN ORGANIZED HEALTH CARE EDUCATION/TRAINING PROGRAM

## 2021-05-28 PROCEDURE — 250N000011 HC RX IP 250 OP 636: Performed by: ANESTHESIOLOGY

## 2021-05-28 PROCEDURE — 86900 BLOOD TYPING SEROLOGIC ABO: CPT | Performed by: STUDENT IN AN ORGANIZED HEALTH CARE EDUCATION/TRAINING PROGRAM

## 2021-05-28 PROCEDURE — 722N000001 HC LABOR CARE VAGINAL DELIVERY SINGLE

## 2021-05-28 RX ORDER — NALOXONE HYDROCHLORIDE 0.4 MG/ML
0.4 INJECTION, SOLUTION INTRAMUSCULAR; INTRAVENOUS; SUBCUTANEOUS
Status: DISCONTINUED | OUTPATIENT
Start: 2021-05-28 | End: 2021-05-28

## 2021-05-28 RX ORDER — OXYTOCIN 10 [USP'U]/ML
10 INJECTION, SOLUTION INTRAMUSCULAR; INTRAVENOUS
Status: DISCONTINUED | OUTPATIENT
Start: 2021-05-28 | End: 2021-05-28

## 2021-05-28 RX ORDER — ACETAMINOPHEN 325 MG/1
650 TABLET ORAL EVERY 4 HOURS PRN
Status: DISCONTINUED | OUTPATIENT
Start: 2021-05-28 | End: 2021-05-30 | Stop reason: HOSPADM

## 2021-05-28 RX ORDER — SODIUM CHLORIDE, SODIUM LACTATE, POTASSIUM CHLORIDE, CALCIUM CHLORIDE 600; 310; 30; 20 MG/100ML; MG/100ML; MG/100ML; MG/100ML
INJECTION, SOLUTION INTRAVENOUS CONTINUOUS
Status: DISCONTINUED | OUTPATIENT
Start: 2021-05-28 | End: 2021-05-28

## 2021-05-28 RX ORDER — TRANEXAMIC ACID 10 MG/ML
1 INJECTION, SOLUTION INTRAVENOUS EVERY 30 MIN PRN
Status: DISCONTINUED | OUTPATIENT
Start: 2021-05-28 | End: 2021-05-30 | Stop reason: HOSPADM

## 2021-05-28 RX ORDER — CARBOPROST TROMETHAMINE 250 UG/ML
250 INJECTION, SOLUTION INTRAMUSCULAR
Status: DISCONTINUED | OUTPATIENT
Start: 2021-05-28 | End: 2021-05-28

## 2021-05-28 RX ORDER — NALBUPHINE HYDROCHLORIDE 10 MG/ML
2.5-5 INJECTION, SOLUTION INTRAMUSCULAR; INTRAVENOUS; SUBCUTANEOUS EVERY 6 HOURS PRN
Status: DISCONTINUED | OUTPATIENT
Start: 2021-05-28 | End: 2021-05-28

## 2021-05-28 RX ORDER — METHYLERGONOVINE MALEATE 0.2 MG/ML
200 INJECTION INTRAVENOUS
Status: DISCONTINUED | OUTPATIENT
Start: 2021-05-28 | End: 2021-05-28

## 2021-05-28 RX ORDER — EPHEDRINE SULFATE 50 MG/ML
5 INJECTION, SOLUTION INTRAMUSCULAR; INTRAVENOUS; SUBCUTANEOUS
Status: DISCONTINUED | OUTPATIENT
Start: 2021-05-28 | End: 2021-05-28

## 2021-05-28 RX ORDER — NALOXONE HYDROCHLORIDE 0.4 MG/ML
0.4 INJECTION, SOLUTION INTRAMUSCULAR; INTRAVENOUS; SUBCUTANEOUS
Status: DISCONTINUED | OUTPATIENT
Start: 2021-05-28 | End: 2021-05-30 | Stop reason: HOSPADM

## 2021-05-28 RX ORDER — BISACODYL 10 MG
10 SUPPOSITORY, RECTAL RECTAL DAILY PRN
Status: DISCONTINUED | OUTPATIENT
Start: 2021-05-30 | End: 2021-05-30 | Stop reason: HOSPADM

## 2021-05-28 RX ORDER — OXYTOCIN/0.9 % SODIUM CHLORIDE 30/500 ML
1-24 PLASTIC BAG, INJECTION (ML) INTRAVENOUS CONTINUOUS
Status: DISCONTINUED | OUTPATIENT
Start: 2021-05-28 | End: 2021-05-28

## 2021-05-28 RX ORDER — LIDOCAINE 40 MG/G
CREAM TOPICAL
Status: DISCONTINUED | OUTPATIENT
Start: 2021-05-28 | End: 2021-05-28

## 2021-05-28 RX ORDER — FENTANYL CITRATE 50 UG/ML
50-100 INJECTION, SOLUTION INTRAMUSCULAR; INTRAVENOUS
Status: DISCONTINUED | OUTPATIENT
Start: 2021-05-28 | End: 2021-05-28

## 2021-05-28 RX ORDER — OXYTOCIN/0.9 % SODIUM CHLORIDE 30/500 ML
340 PLASTIC BAG, INJECTION (ML) INTRAVENOUS CONTINUOUS PRN
Status: DISCONTINUED | OUTPATIENT
Start: 2021-05-28 | End: 2021-05-30 | Stop reason: HOSPADM

## 2021-05-28 RX ORDER — PRENATAL VIT/IRON FUM/FOLIC AC 27MG-0.8MG
1 TABLET ORAL DAILY
Status: DISCONTINUED | OUTPATIENT
Start: 2021-05-28 | End: 2021-05-30 | Stop reason: HOSPADM

## 2021-05-28 RX ORDER — MODIFIED LANOLIN
OINTMENT (GRAM) TOPICAL
Status: DISCONTINUED | OUTPATIENT
Start: 2021-05-28 | End: 2021-05-30 | Stop reason: HOSPADM

## 2021-05-28 RX ORDER — ACETAMINOPHEN 325 MG/1
650 TABLET ORAL EVERY 4 HOURS PRN
Status: DISCONTINUED | OUTPATIENT
Start: 2021-05-28 | End: 2021-05-28

## 2021-05-28 RX ORDER — OXYCODONE AND ACETAMINOPHEN 5; 325 MG/1; MG/1
1 TABLET ORAL
Status: DISCONTINUED | OUTPATIENT
Start: 2021-05-28 | End: 2021-05-28

## 2021-05-28 RX ORDER — PRENATAL VIT/IRON FUM/FOLIC AC 27MG-0.8MG
1 TABLET ORAL DAILY
Status: DISCONTINUED | OUTPATIENT
Start: 2021-05-28 | End: 2021-05-28 | Stop reason: RX

## 2021-05-28 RX ORDER — FENTANYL/BUPIVACAINE/NS/PF 2-1250MCG
PLASTIC BAG, INJECTION (ML) INJECTION
Status: DISCONTINUED
Start: 2021-05-28 | End: 2021-05-28 | Stop reason: HOSPADM

## 2021-05-28 RX ORDER — NALOXONE HYDROCHLORIDE 0.4 MG/ML
0.2 INJECTION, SOLUTION INTRAMUSCULAR; INTRAVENOUS; SUBCUTANEOUS
Status: DISCONTINUED | OUTPATIENT
Start: 2021-05-28 | End: 2021-05-30 | Stop reason: HOSPADM

## 2021-05-28 RX ORDER — MISOPROSTOL 200 UG/1
800 TABLET ORAL
Status: DISCONTINUED | OUTPATIENT
Start: 2021-05-28 | End: 2021-05-30 | Stop reason: HOSPADM

## 2021-05-28 RX ORDER — NALOXONE HYDROCHLORIDE 0.4 MG/ML
0.2 INJECTION, SOLUTION INTRAMUSCULAR; INTRAVENOUS; SUBCUTANEOUS
Status: DISCONTINUED | OUTPATIENT
Start: 2021-05-28 | End: 2021-05-28

## 2021-05-28 RX ORDER — AMOXICILLIN 250 MG
1 CAPSULE ORAL 2 TIMES DAILY
Status: DISCONTINUED | OUTPATIENT
Start: 2021-05-28 | End: 2021-05-30 | Stop reason: HOSPADM

## 2021-05-28 RX ORDER — IBUPROFEN 800 MG/1
800 TABLET, FILM COATED ORAL
Status: DISCONTINUED | OUTPATIENT
Start: 2021-05-28 | End: 2021-05-28

## 2021-05-28 RX ORDER — IBUPROFEN 800 MG/1
800 TABLET, FILM COATED ORAL EVERY 6 HOURS PRN
Status: DISCONTINUED | OUTPATIENT
Start: 2021-05-28 | End: 2021-05-30 | Stop reason: HOSPADM

## 2021-05-28 RX ORDER — OXYCODONE HYDROCHLORIDE 5 MG/1
5 TABLET ORAL EVERY 4 HOURS PRN
Status: DISCONTINUED | OUTPATIENT
Start: 2021-05-28 | End: 2021-05-30 | Stop reason: HOSPADM

## 2021-05-28 RX ORDER — METHYLERGONOVINE MALEATE 0.2 MG/ML
200 INJECTION INTRAVENOUS
Status: DISCONTINUED | OUTPATIENT
Start: 2021-05-28 | End: 2021-05-30 | Stop reason: HOSPADM

## 2021-05-28 RX ORDER — TRANEXAMIC ACID 10 MG/ML
1 INJECTION, SOLUTION INTRAVENOUS EVERY 30 MIN PRN
Status: DISCONTINUED | OUTPATIENT
Start: 2021-05-28 | End: 2021-05-28

## 2021-05-28 RX ORDER — DULOXETIN HYDROCHLORIDE 60 MG/1
60 CAPSULE, DELAYED RELEASE ORAL DAILY
Status: DISCONTINUED | OUTPATIENT
Start: 2021-05-28 | End: 2021-05-30 | Stop reason: HOSPADM

## 2021-05-28 RX ORDER — HYDROCORTISONE 2.5 %
CREAM (GRAM) TOPICAL 3 TIMES DAILY PRN
Status: DISCONTINUED | OUTPATIENT
Start: 2021-05-28 | End: 2021-05-30 | Stop reason: HOSPADM

## 2021-05-28 RX ORDER — AMOXICILLIN 250 MG
2 CAPSULE ORAL 2 TIMES DAILY
Status: DISCONTINUED | OUTPATIENT
Start: 2021-05-28 | End: 2021-05-30 | Stop reason: HOSPADM

## 2021-05-28 RX ORDER — CARBOPROST TROMETHAMINE 250 UG/ML
250 INJECTION, SOLUTION INTRAMUSCULAR
Status: DISCONTINUED | OUTPATIENT
Start: 2021-05-28 | End: 2021-05-30 | Stop reason: HOSPADM

## 2021-05-28 RX ORDER — OXYTOCIN/0.9 % SODIUM CHLORIDE 30/500 ML
100-340 PLASTIC BAG, INJECTION (ML) INTRAVENOUS CONTINUOUS PRN
Status: DISCONTINUED | OUTPATIENT
Start: 2021-05-28 | End: 2021-05-28

## 2021-05-28 RX ORDER — OXYTOCIN 10 [USP'U]/ML
10 INJECTION, SOLUTION INTRAMUSCULAR; INTRAVENOUS
Status: DISCONTINUED | OUTPATIENT
Start: 2021-05-28 | End: 2021-05-30 | Stop reason: HOSPADM

## 2021-05-28 RX ORDER — ONDANSETRON 2 MG/ML
4 INJECTION INTRAMUSCULAR; INTRAVENOUS EVERY 6 HOURS PRN
Status: DISCONTINUED | OUTPATIENT
Start: 2021-05-28 | End: 2021-05-28

## 2021-05-28 RX ORDER — OXYTOCIN/0.9 % SODIUM CHLORIDE 30/500 ML
100 PLASTIC BAG, INJECTION (ML) INTRAVENOUS CONTINUOUS
Status: DISCONTINUED | OUTPATIENT
Start: 2021-05-28 | End: 2021-05-30 | Stop reason: HOSPADM

## 2021-05-28 RX ADMIN — Medication 10 ML/HR: at 11:30

## 2021-05-28 RX ADMIN — PRENATAL VITAMINS-IRON FUMARATE 27 MG IRON-FOLIC ACID 0.8 MG TABLET 1 TABLET: at 19:33

## 2021-05-28 RX ADMIN — FENTANYL CITRATE 100 MCG: 50 INJECTION, SOLUTION INTRAMUSCULAR; INTRAVENOUS at 10:19

## 2021-05-28 RX ADMIN — IBUPROFEN 800 MG: 800 TABLET, FILM COATED ORAL at 17:57

## 2021-05-28 RX ADMIN — ONDANSETRON HYDROCHLORIDE 4 MG: 2 INJECTION, SOLUTION INTRAMUSCULAR; INTRAVENOUS at 14:31

## 2021-05-28 RX ADMIN — DULOXETINE HYDROCHLORIDE 60 MG: 60 CAPSULE, DELAYED RELEASE ORAL at 19:32

## 2021-05-28 RX ADMIN — Medication 2 MILLI-UNITS/MIN: at 08:46

## 2021-05-28 RX ADMIN — DOCUSATE SODIUM 50 MG AND SENNOSIDES 8.6 MG 1 TABLET: 8.6; 5 TABLET, FILM COATED ORAL at 19:32

## 2021-05-28 RX ADMIN — LIDOCAINE HYDROCHLORIDE 30 ML: 10 INJECTION, SOLUTION EPIDURAL; INFILTRATION; INTRACAUDAL; PERINEURAL at 16:22

## 2021-05-28 RX ADMIN — SODIUM CHLORIDE, POTASSIUM CHLORIDE, SODIUM LACTATE AND CALCIUM CHLORIDE 500 ML: 600; 310; 30; 20 INJECTION, SOLUTION INTRAVENOUS at 10:19

## 2021-05-28 RX ADMIN — SODIUM CHLORIDE, POTASSIUM CHLORIDE, SODIUM LACTATE AND CALCIUM CHLORIDE: 600; 310; 30; 20 INJECTION, SOLUTION INTRAVENOUS at 08:46

## 2021-05-28 RX ADMIN — SODIUM CHLORIDE, POTASSIUM CHLORIDE, SODIUM LACTATE AND CALCIUM CHLORIDE: 600; 310; 30; 20 INJECTION, SOLUTION INTRAVENOUS at 13:08

## 2021-05-28 RX ADMIN — Medication 1 MILLI-UNITS/MIN: at 15:28

## 2021-05-28 ASSESSMENT — MIFFLIN-ST. JEOR: SCORE: 1517

## 2021-05-28 NOTE — PROGRESS NOTES
[facility]   Post-partum Note    Name:  Hazel Ponce  MRN: [id number]    S: Patient states she is doing very well.  Pain is controlled.  Tolerating regular diet without nausea or vomiting. Voiding spontaneously, passing flatus but no bowel movement as of yet.   Ambulating without dizziness.  Lochia similar to menss.  Breast feeding. Desires POPs for contraception.  Plans discharge this evening if all possible, however, wants to wait until after circumcision.    O:   Patient Vitals for the past 24 hrs:   BP Temp Temp src Pulse Resp SpO2   21 0100 121/68 98.4  F (36.9  C) Oral 101 16 --   21 1803 113/61 -- -- -- -- --   21 1749 116/57 -- -- -- -- --   21 1748 119/43 -- -- -- -- --   21 1733 103/55 -- -- -- -- --   21 1718 108/64 -- -- -- -- --   21 1703 109/67 -- -- -- -- --   21 1648 117/64 -- -- -- -- --   21 1633 115/59 -- -- -- -- --   21 1618 117/61 -- -- -- -- --   21 1611 111/61 -- -- -- -- --   21 1543 -- 98.7  F (37.1  C) Oral -- -- --   21 1526 109/67 -- -- -- -- --   21 1456 100/56 -- -- -- -- --   21 1426 123/64 -- -- -- -- --   21 1357 (!) 152/88 -- -- -- -- --   21 1326 127/78 -- -- -- -- --   21 1134 110/71 -- -- -- -- (!) 88 %   21 1127 90/51 -- -- -- -- 98 %   21 1125 100/57 -- -- -- 18 --   21 1123 111/70 -- -- -- 18 --   21 1121 104/58 -- -- -- 18 --   21 1119 103/57 -- -- -- 18 --   21 0918 133/83 97.8  F (36.6  C) Oral -- 18 --     Gen:  Resting comfortably, NAD  CV:  Regular rate  Pulm:  Non-labored breathing.  No cough or wheezing.   Abd:  Soft, appropriately tender to palpation, non-distended.  Fundus at  umbilicus, firm and non-tender.  Ext:  Non-tender, 1+ LE edema b/l    Labs:   Component      Latest Ref Rng & Units 2021   Hemoglobin      11.7 - 15.7 g/dL 10.0 (L)         Assessment/Plan:  25 year old  on PPD #1 s/p VAVD  for fetal indications.  Continue with routine postpartum management.     Her prenatal course has been complicated by   - Mood disorder on cymbalta  - R sided Kidney colic; hx of R nephrolithiasis  - Hx of MRSA with negative repeat swabs  - Hep B NI status     Pain: Well-controlled with ibuprofen and tylenol  Hgb: 12.3>QBL 754cc> 10.0. VSS as noted above, asymptomatic.   GI:  BID Senna/Colace.  PRN Simethicone.   PPx:  Encouraged ambulation   Rh: Negative, baby Rh positive. Rhogam today.   Rubella: Immune   Feed: Breast   BC: POPs  Other:   - MDD on Cymbalta, has psychiatrist.  Will plan on 2 week mood check     Dispo: Plan for home on PPD#1-2.     Kelly Barfield MD   Pager: [phone]   5/29/2021

## 2021-05-28 NOTE — H&P
"  May 28, 2021    Hazel Ponce  [id number]      OB Admit History & Physical      Ms. Ponce  is here for rule out LOF.    She has noticed leaking vaginal fluid since 0245 hrs this morning. Denies bleeding, abnormal discharge. States +FM.     No LMP recorded. Patient is pregnant.   Her Estimated Date of Delivery: Constantine 3, 2021, making her 39w1d.      Estimated body mass index is 31.35 kg/m  as calculated from the following:    Height as of this encounter: 1.6 m (5' 3\").    Weight as of this encounter: 80.3 kg (177 lb).  Her prenatal course has been complicated by   - Mood disorder on cymbalta  - R sided Kidney colic; hx of R nephrolithiasis  - Hx of MRSA with negative repeat swabs    See prenatal for labs.  neg GBS, Rubella Immune, RH Positive      She is a 25 year old   Her OB history:   OB History    Para Term  AB Living   1 0 0 0 0 0   SAB TAB Ectopic Multiple Live Births   0 0 0 0 0      # Outcome Date GA Lbr Reji/2nd Weight Sex Delivery Anes PTL Lv   1 Current                     Past Medical History:   Diagnosis Date     Depressive disorder      MRSA (methicillin resistant staph aureus) culture positive     Knee        History reviewed. No pertinent surgical history.      No current outpatient medications on file.       Allergies: Patient has no known allergies.      REVIEW OF SYSTEMS:  NEUROLOGIC:  Negative  EYES:  Negative  ENT:  Negative  GI:  Negative  :  Negative  GYN:  Negative  CV:  Negative  PULMONARY:  Negative  MUSCULOSKELETAL:  Negative  PSYCH:  Negative      Social History     Socioeconomic History     Marital status: Single     Spouse name: Not on file     Number of children: Not on file     Years of education: Not on file     Highest education level: Not on file   Occupational History     Not on file   Social Needs     Financial resource strain: Not on file     Food insecurity     Worry: Not on file     Inability: Not on file     Transportation needs     Medical: Not on file " "    Non-medical: Not on file   Tobacco Use     Smoking status: Former Smoker     Smokeless tobacco: Never Used   Substance and Sexual Activity     Alcohol use: Not Currently     Drug use: No     Sexual activity: Not on file   Lifestyle     Physical activity     Days per week: Not on file     Minutes per session: Not on file     Stress: Not on file   Relationships     Social connections     Talks on phone: Not on file     Gets together: Not on file     Attends Zoroastrianism service: Not on file     Active member of club or organization: Not on file     Attends meetings of clubs or organizations: Not on file     Relationship status: Not on file     Intimate partner violence     Fear of current or ex partner: Not on file     Emotionally abused: Not on file     Physically abused: Not on file     Forced sexual activity: Not on file   Other Topics Concern     Not on file   Social History Narrative     Not on file      History reviewed. No pertinent family history.      Exam:    Vitals:   /69   Pulse 80   Temp 98.8  F (37.1  C) (Oral)   Resp 18   Ht 1.6 m (5' 3\")   Wt 80.3 kg (177 lb)   BMI 31.35 kg/m    [place]:  ctxs q 4-5 min  FHT: 130 bpm, mod, a +, d-    Alert Awake in NAD  HEENT grossly normal  Neck: no lymphadenopathy or thryoidomegaly  Lungs CTAB  Back No CVAT  Heart RR  ABD gravid, NABS, soft, NT on exam with NT fundus palpable  Cervix is 3/90/-2  EXT:  no edema, no calf tenderness      Assessment/Plan: Hazel Ponce is a 25 year old  at 39w1d GA here with SROM.     Prenatal Care:  - OB labs reviewed: B, Rubella immune, Heb B non-immune  - Genetics: declined  - Anatomy ultrasound: normal 20 week US  - Rh negative, Rhogam on 3/19/21  - 28 week labs 1 hour , hemoglobin 11.8, RPR nonreactive, hep C negative, MRSA negative  - S/p flu 10/22/21  - Tdap 3/19/21  - GBS negative  - Feed: breast; has pump  - Contraception:  POPs    Others  - Mood disorder   - continue on Cymbalta 60 mg   - Hx of " MRSA   - negative swab on 10/22/20, 2/7/21, 3/18/2021  - L sided renal colic; hx of right kidney stone   - on Flomax    SROM  - continue with pitocin per IOL protocol  - FHT Cat I  - continue prolonged monitoring    Dr. Antelmo Rangel  [phone]       Jory Rangel MD  Dept of OB/GYN  May 28, 2021

## 2021-05-28 NOTE — ANESTHESIA PREPROCEDURE EVALUATION
Anesthesia Pre-Procedure Evaluation    Patient: Hazel Ponce   MRN: [id number] : 1995        Preoperative Diagnosis: * No surgery found *   Procedure :      Past Medical History:   Diagnosis Date     Depressive disorder      MRSA (methicillin resistant staph aureus) culture positive 2006    Knee      History reviewed. No pertinent surgical history.   No Known Allergies   Social History     Tobacco Use     Smoking status: Former Smoker     Smokeless tobacco: Never Used   Substance Use Topics     Alcohol use: Not Currently      Wt Readings from Last 1 Encounters:   21 80.3 kg (177 lb)        Anesthesia Evaluation            ROS/MED HX  ENT/Pulmonary:  - neg pulmonary ROS     Neurologic:       Cardiovascular:  - neg cardiovascular ROS     METS/Exercise Tolerance:     Hematologic:       Musculoskeletal:       GI/Hepatic:       Renal/Genitourinary:       Endo:       Psychiatric/Substance Use:       Infectious Disease:       Malignancy:       Other:            Physical Exam    Airway        Mallampati: III   TM distance: > 3 FB   Neck ROM: full     Respiratory Devices and Support         Dental           Cardiovascular             Pulmonary                   OUTSIDE LABS:  CBC:   Lab Results   Component Value Date    WBC 24.1 (H) 2021    HGB 12.3 2021    HGB 12.7 2021    HCT 39.2 2021     2021     BMP:   Lab Results   Component Value Date     2021    POTASSIUM 3.7 2021    CHLORIDE 107 2021    CO2 21 2021    BUN 9 2021    CR 0.85 2021    GLC 93 2021     COAGS: No results found for: PTT, INR, FIBR  POC: No results found for: BGM, HCG, HCGS  HEPATIC:   Lab Results   Component Value Date    ALBUMIN 2.7 (L) 2021    PROTTOTAL 7.2 2021    ALT 28 2021    AST 15 2021    ALKPHOS 72 2021    BILITOTAL 0.9 2021     OTHER:   Lab Results   Component Value Date    CHER 8.6 2021       Anesthesia  Plan    ASA Status:  2      Anesthesia Type: Epidural.              Consents    Anesthesia Plan(s) and associated risks, benefits, and realistic alternatives discussed. Questions answered and patient/representative(s) expressed understanding.     - Discussed with:  Patient         Postoperative Care            Comments:           neg OB ROS.       Rajiv Harris MD

## 2021-05-28 NOTE — PLAN OF CARE
Data: Patient presented to [facility]: 2021  3:55 AM.  Reason for maternal/fetal assessment is leaking vaginal fluid. Patient reports possible rupture of membranes at 0245 this morning.  Patient is a .  Prenatal record reviewed. Pregnancy has been uncomplicated..  Gestational Age 39w1d. VSS. Fetal movement active. Patient denies vaginal bleeding, abdominal pain, pelvic pressure, nausea, vomiting, headache, visual disturbances, epigastric or URQ pain, significant edema. Support person is present.   Action: Verbal consent for EFM. Triage assessment completed. Bill of rights reviewed.  Response: Patient verbalized agreement with plan. Will contact Dr Sheila Matson with update and for further orders after ROM plus results.

## 2021-05-28 NOTE — PROGRESS NOTES
STRIP REVIEW    [place] 4-5 ctxs q min   bpm, mod, a+, variable decelerations with contractions and occasional late decelerations    FHT Cat 2  Improving now that pitocin has been turned off  Will continue to monitor closely  Anticipate     Dr. Antelmo Rangel  [phone]

## 2021-05-28 NOTE — PROVIDER NOTIFICATION
05/28/21 0443   Provider Notification   Provider Name/Title Dr Matson   Method of Notification Phone   Request Evaluate - Remote   Notification Reason Patient Arrived;Membrane Status;Uterine Activity;SVE;Status Update     Dr Matson notified of arrival, GBS negative, B negative blood type, rhogam given in pregnancy, history of MRSA positive prior to pregnancy but has had 3 negative cultures, history of kidney stones this pregnancy but has no more pain and is unsure if she every passed the stone.  Patient states she has had no history of drug use, but noted in prenatal of history of marijuana use.  Will clarify with patient and send urine for toxicology if needed.  Category 1 tracing noted and cramping noted on toco every 2-5 minutes.  Patient is having occasional pain with the cramps but is not feeling every cramp.  Informed of SVE and bishops score is 7. Plan to recheck  cervix at 0645 and if is unchanged will start oxytocin augmentation.

## 2021-05-28 NOTE — PROVIDER NOTIFICATION
05/28/21 1332   Provider Notification   Provider Name/Title Dr. mg   Method of Notification Phone   MD called unit. Viewed strip again. Received orders to shut off IV Pitocin. Will relay back to patient.

## 2021-05-28 NOTE — PLAN OF CARE
Data: Hazel Ponce transferred to 449 via wheelchair at 1845. Baby transferred via parent's arms.  Action: Receiving unit notified of transfer: Yes. Patient and family notified of room change. Report will be given to RN  at 1900. Belongings sent to receiving unit. Accompanied by Registered Nurse. Oriented patient to surroundings. Call light within reach. ID bands will be double-checked with receiving RN. Patient was able to urinate prior to transferring to [facility], patient feeling like she emptied her bladder. IV saline Locked, . Ibuprofen given at 1800.  Response: Patient tolerated transfer and is stable.

## 2021-05-28 NOTE — ANESTHESIA PROCEDURE NOTES
Epidural catheter Procedure Note  Pre-Procedure   Staff -        Performed By: Anesthesiologist  Procedure DocumentationProcedure: epidural catheter  Comments:  Pre-Procedure  Performed by Rajiv Harris MD  Location: OB.      PreAnesthestic Checklist: patient identified, IV checked, risks and benefits discussed, informed consent obtained, monitors and equipment checked, pre-op evaluation and at physician/surgeon's request.    Timeout   Correct Patient: Yes  Correct Procedure: Epidural catheter placement  Correct Site: Yes   Correct Position: Yes    Procedure Documentation  Procedure:   Epidural catheter block for Labor    Patient currently in labor and she and OBMD request a labor epidural to control her labor pains. Patient was interviewed and examined. Procedure and risks including but not limited to bleeding, infection, nerve injury, paralysis, PDPH, and inadequate block requiring intervention discussed with patient. Questions answered. This epidural is to be placed in anticipation of vaginal delivery.  She consents to the epidural procedure.  Time-out was performed.  I or my partners remain immediately available for management of any issues or complications and will monitor at appropriate intervals.  Procedure: Patient sitting. Betadine prep x 3. Sterile drape applied.  Mask and gloves used.  Lidocaine 1%  local infiltration at L 3-4.  17 G. Tuohy needle at L3-4 by loss of resistance into epidural space.  No CSF, paresthesia or blood. 1.5 % Lidocaine with 1:200,000 Epinephrine 5cc test dose. Epidural catheter inserted w/o resistance to 5 cm in epidural space. Then 0.125% bupivicaine with fentanyl 2 mcg/ml 12 cc with NS 5 cc.  Aspiration negative for blood and CSF.   Negative for neuro change, paresthesia or symptoms of intravascular injection or intrathecal injection.  Infusion orders written and infusion of 0.125% bupivicaine with fentanyl 2 mcg/ml at 10cc per hour started.    Rajiv Harris MD

## 2021-05-28 NOTE — PROVIDER NOTIFICATION
05/28/21 1321   Provider Notification   Provider Name/Title Dr. Rangel   Method of Notification Phone   Request Evaluate - Remote   Notification Reason Decels;Labor Status;Uterine Activity;Status Update;SVE   Updated MD that epidural was placed around 1130 this am, and patient is now comfortable. SVE has changed from 3.5, to 6.5, to 8 in a matter of a couple of hours. During this time FHT's have had come variable decels, and some late decels. Repositioned patient side to side, tried throne position. FHT's seem to be tolerated on the left side as of right now. Notified MD that IV fluid bolus has been given prior to epidural , and running currently. Notified MD that Contraction pattern is irregular from 4-6 minutes apart, 1-6 part, and IV Pitocin is at 3 mu/hr.   MD viewing strip. Received orders to turn patient as able, and keep plan of care as is. Update MD as needed.

## 2021-05-28 NOTE — L&D DELIVERY NOTE
[facility]  Vacuum-Assisted Vaginal Delivery Note    Indication for operative delivery: Fetal heart rate: recurrent variable decelerations     Adequate anesthesia was established with epidural local anesthesia. The patient was already in the dorsal lithotomy position.    Fetal position was confirmed to be at +3-4 station with confirmed rupture of membranes. A disposable rigid mushroom vacuum extraction cup was appiled to the fetal median flexion point. It was centered over the sagittal sutures approximately 3cm anterior to the posterior fontanelle. The area around the vacuum cup was palpated to confirm that there was no maternal tissue within the cup margin. With the start of the contraction, a vacuum seal was established at 40 cmHg, and gentle traction was applied. Advancement of the fetal head was noted with gentle traction applied to the device. A total of 2 pulls were performed, and 1 popoffs occurred. one reapplications were performed.  The vacuum was released upon  of the fetal head, and delivery was performed thereafter with the assistance of maternal expulsive efforts with contractions. Anterior shoulder delivered atraumatically by maternal expulsive efforts with the assistance of downward traction. The posterior shoulder was delivered with maternal expulsive efforts and upward traction. The remainder of the fetus delivered spontaneously. The infant was bulb-suctioned maternal abdomen on abdomen. The cord was clamped and cut. Cord blood for gases collected and sent. The infant was then passed to the awaiting pediatric team at bedside.    The placenta delivered spontaneously, intact, with a 3-vessel cord. To enhance uterine contractions, 20 units of IV Pitocin were administered. The vaginal vault and perineum were examined for any delivery-related injuries. A right vaginal laceration was repaired with a running locking stitch using a 3-0 vicryl. A labial minora laceration all the way up to  the clitoris was repaired with a 4-0 vicryl in a running stitch fashion. No complications    At delivery, the infant was noted to be moving all extremities appropriately.  MKI=805 ml    Dr. Antelmo Rangel  [phone]          Dr. Antelmo Rangle  [phone]      Delivery Summary    Hazel Ponce MRN# [id number]   Age: 25 year old YOB: 1995          Rosario Male-Hazel [[id number]]    Labor Event Times    Dilation complete date: 21 Complete time:  3:21 PM   Start pushing date/time: 2021 1530      Labor Events     labor?: No     Rupture date/time: 21 0245   Rupture type: Spontaneous rupture of membranes occuring during spontaneous labor or augmentation  Fluid color: Clear  Fluid odor: Normal     Augmentation: Oxytocin  Indications for augmentation: Ineffective Contraction Pattern  1:1 continuous labor support provided by?: RN       Delivery/Placenta Date and Time    Delivery Date: 21 Delivery Time:  3:56 PM   Placenta Date/Time: 2021  4:11 PM  Oxytocin given at the time of delivery: after delivery of placenta   Other personnel present at delivery:  Provider Role   Jory Rangel MD          Vaginal Counts     Initial count performed by 2 team members:  Two Team Members   Dr Juan Carlos Bonner, RN       Needles Suture Needles Sponges (RETIRED) Instruments   Initial counts 2  5    Added to count  3 5    Relief counts       Final counts 2 3 10          Placed during labor Accounted for at the end of labor   FSE No NA   IUPC No NA   Cervadil No NA         Relief count performed by 2 team members:  Two Team Members   RITA Vicente       Final count performed by 2 team members:  Two Team Members   Dr Juan Carlos SALINAS      Final count correct?: Yes     Apgars    Living status: Living   1 Minute 5 Minute 10 Minute 15 Minute 20 Minute   Skin color:        Heart rate:        Reflex irritability:        Muscle tone:        Respiratory effort:        Total:         Apgars assigned by: ABRAN MCKINLEY     Cord    Vessels: 3 Vessels    Cord Complications: None               Cord Blood Disposition: Lab    Gases Sent?: Yes    Delayed cord clamping?: Yes    Cord Clamping Delay (seconds): 31-60 seconds    Stem cell collection?: No       [place] Resuscitation    Output in Delivery Room: Stool      Measurements    Output in delivery room: Stool     Labor Events and Shoulder Dystocia    Fetal Tracing Prior to Delivery: Category 2  Fetal Tracing Comments: recurrent variable decelerations, moderate variability and accelerations were maintained throughout   Shoulder dystocia present?: Neg     Delivery (Maternal) (Provider to Complete) ([id number])    Episiotomy: None  Perineal lacerations: None    Labial laceration: right Repaired?: Yes   Vaginal laceration?: Yes Repaired?: Yes   Repair suture: 3-0 Vicryl, 4-0 Vicryl  Genital tract inspection done: Pos     Blood Loss  Mother: Hazel Ponce Rosario #[id number]   Start of Mother's Information    IO Blood Loss  21 0356 - 21 1635    Delivery QBL (mL) Hospital Encounter 754 mL    Total  754 mL         End of Mother's Information  Mother: Hazel Ponce Rosario #[id number]          Delivery - Provider to Complete ([id number])    Attempted Delivery Types (Choose all that apply): Spontaneous Vaginal Delivery  Delivery Type (Choose the 1 that will go to the Birth History): , Vaginal Vacuum    Verbal Informed Consent Obtained For Vacuum: Yes     Emergency Resources Available (vacuum): Resuscitation Team   Type (vacuum): [place]       # of Pop-Offs (vacuum): 1    Position (vacuum): OA Fetal Station (vacuum): +3, +4      Indication for Operative Vaginal Delivery (vacuum): Fetal Status   Operative Vaginal Delivery Brief Note Vacuum: Please see vaginal delivery for further details             Other personnel:  Provider Role   Jory Rangel MD                 Placenta    Date/Time: 2021  4:11 PM  Removal: Spontaneous  Disposition: Hospital  disposal           Anesthesia    Method: Epidural  Cervical dilation at placement: 4-7                Presentation and Position    Presentation: Vertex    Position: Left Occiput Anterior                 Jory Rangel MD

## 2021-05-28 NOTE — PLAN OF CARE
Patient informed of need for urine tox due to current or prior illicit or unprescribed drug use including maternal self-report.  Verbal consent obtained and maternal urine sent. Umbilical cord segment has been sent for toxicology.

## 2021-05-28 NOTE — PROVIDER NOTIFICATION
05/28/21 1504   Provider Notification   Provider Name/Title Dr. mg   Method of Notification Phone   MD called unit for an update. Updated MD that at 1430 turned patient to her right side. Contractions are reading inverted, and not as strong. FHT's are moderate variability with out a decel since 1345. Patient is due to be turned to the other side at 1530, notified MD that is when this nurse was going to check patient;s SVE. Received orders that Dr. Mg is going to come over to L&D around that time  As well.

## 2021-05-28 NOTE — PLAN OF CARE
Data: Patient admitted to room 405 at 0450. Patient is a . Prenatal record reviewed.   OB History    Para Term  AB Living   1 0 0 0 0 0   SAB TAB Ectopic Multiple Live Births   0 0 0 0 0      # Outcome Date GA Lbr Reji/2nd Weight Sex Delivery Anes PTL Lv   1 Current            .  Medical History:   Past Medical History:   Diagnosis Date     Depressive disorder      MRSA (methicillin resistant staph aureus) culture positive     Knee   .  Gestational age 39w1d. Vital signs per doc flowsheet. Fetal movement present. Patient reports Rule out rupture of membranes   as reason for admission. Support persons father of baby present.  Action:   Verbal consent for EFM, external fetal monitors applied. Admission assessment completed. Patient and support persons educated on labor process. Patient instructed to report change in fetal movement, contractions, vaginal leaking of fluid or bleeding, abdominal pain, or any concerns related to the pregnancy to her nurse/physician. Patient oriented to room, call light in reach.   Response: Dr. Matson informed of status. Plan per provider is observe labor until 0645.  If SVE unchanged will start oxytocin. Patient verbalized understanding of education and verbalized agreement with plan. Patient not feeling pain at this time.

## 2021-05-29 LAB
ABO + RH BLD: NORMAL
ABO + RH BLD: NORMAL
BLOOD BANK CMNT PATIENT-IMP: NORMAL
BLOOD BANK CMNT PATIENT-IMP: NORMAL
DATE RH IMM GL GVN: NORMAL
FETAL CELL SCN BLD QL ROSETTE: NORMAL
HGB BLD-MCNC: 10 G/DL (ref 11.7–15.7)
RH IG VIALS RECOM PATIENT: NORMAL

## 2021-05-29 PROCEDURE — 36415 COLL VENOUS BLD VENIPUNCTURE: CPT | Performed by: OBSTETRICS & GYNECOLOGY

## 2021-05-29 PROCEDURE — 85461 HEMOGLOBIN FETAL: CPT | Performed by: STUDENT IN AN ORGANIZED HEALTH CARE EDUCATION/TRAINING PROGRAM

## 2021-05-29 PROCEDURE — 120N000001 HC R&B MED SURG/OB

## 2021-05-29 PROCEDURE — 3E0234Z INTRODUCTION OF SERUM, TOXOID AND VACCINE INTO MUSCLE, PERCUTANEOUS APPROACH: ICD-10-PCS | Performed by: OBSTETRICS & GYNECOLOGY

## 2021-05-29 PROCEDURE — 86901 BLOOD TYPING SEROLOGIC RH(D): CPT | Performed by: STUDENT IN AN ORGANIZED HEALTH CARE EDUCATION/TRAINING PROGRAM

## 2021-05-29 PROCEDURE — 85018 HEMOGLOBIN: CPT | Performed by: OBSTETRICS & GYNECOLOGY

## 2021-05-29 PROCEDURE — 86900 BLOOD TYPING SEROLOGIC ABO: CPT | Performed by: STUDENT IN AN ORGANIZED HEALTH CARE EDUCATION/TRAINING PROGRAM

## 2021-05-29 PROCEDURE — 250N000011 HC RX IP 250 OP 636: Performed by: OBSTETRICS & GYNECOLOGY

## 2021-05-29 PROCEDURE — 86900 BLOOD TYPING SEROLOGIC ABO: CPT | Performed by: OBSTETRICS & GYNECOLOGY

## 2021-05-29 PROCEDURE — 250N000013 HC RX MED GY IP 250 OP 250 PS 637: Performed by: OBSTETRICS & GYNECOLOGY

## 2021-05-29 RX ORDER — AMOXICILLIN 250 MG
1 CAPSULE ORAL 2 TIMES DAILY PRN
Qty: 10 TABLET | Refills: 1 | Status: ON HOLD | OUTPATIENT
Start: 2021-05-29 | End: 2023-03-25

## 2021-05-29 RX ORDER — IBUPROFEN 800 MG/1
800 TABLET, FILM COATED ORAL EVERY 6 HOURS PRN
Qty: 60 TABLET | Refills: 1 | Status: ON HOLD | OUTPATIENT
Start: 2021-05-29 | End: 2022-11-28

## 2021-05-29 RX ADMIN — IBUPROFEN 800 MG: 800 TABLET, FILM COATED ORAL at 04:15

## 2021-05-29 RX ADMIN — IBUPROFEN 800 MG: 800 TABLET, FILM COATED ORAL at 12:13

## 2021-05-29 RX ADMIN — ACETAMINOPHEN 650 MG: 325 TABLET, FILM COATED ORAL at 17:54

## 2021-05-29 RX ADMIN — DULOXETINE HYDROCHLORIDE 60 MG: 60 CAPSULE, DELAYED RELEASE ORAL at 09:45

## 2021-05-29 RX ADMIN — IBUPROFEN 800 MG: 800 TABLET, FILM COATED ORAL at 17:54

## 2021-05-29 RX ADMIN — PRENATAL VITAMINS-IRON FUMARATE 27 MG IRON-FOLIC ACID 0.8 MG TABLET 1 TABLET: at 09:45

## 2021-05-29 RX ADMIN — DOCUSATE SODIUM 50 MG AND SENNOSIDES 8.6 MG 1 TABLET: 8.6; 5 TABLET, FILM COATED ORAL at 09:44

## 2021-05-29 RX ADMIN — HUMAN RHO(D) IMMUNE GLOBULIN 300 MCG: 300 INJECTION, SOLUTION INTRAMUSCULAR at 12:13

## 2021-05-29 NOTE — PROVIDER NOTIFICATION
05/28/21 1521   Provider Notification   Provider Name/Title Dr. Rangel   Method of Notification At Bedside   MD to check SVE- 10/100/+1. MD would like to set patient up to start pushing. Will discontinue patient's abrams.

## 2021-05-29 NOTE — DISCHARGE SUMMARY
[facility] Discharge Summary    Hazel Ponce MRN# [id number]   Age: 25 year old YOB: 1995     Date of Admission:  5/28/2021  Date of Discharge::  5/29/2021   Admitting Physician:  Sheila Matson MD  Discharge Physician:  Jory Rangel MD           Admission Diagnoses:     IUP at 39w1d    Nephrolithiasis    MDD on Cymbalta     Hep B NI status   * ABLA, asymptomatic          Discharge Diagnosis:       IUP at 39w1d, now delivered          Procedures:     Procedure(s):   VAVD for fetal indications    Epidural           Medications Prior to Admission:     Medications Prior to Admission   Medication Sig Dispense Refill Last Dose     cholecalciferol 50 MCG (2000 UT) CAPS Take 2,000 Units by mouth daily        DULoxetine (CYMBALTA) 60 MG capsule Take 60 mg by mouth daily        Prenatal Vit-Fe Fumarate-FA (PRENATAL PLUS) 27-1 MG TABS Take 1 tablet by mouth daily        [DISCONTINUED] acetaminophen (TYLENOL) 325 MG tablet Take 2 tablets (650 mg) by mouth every 4 hours as needed for mild pain or fever 30 tablet 0      [DISCONTINUED] diphenhydrAMINE (BENADRYL) 25 MG capsule Take 1 capsule (25 mg) by mouth every 6 hours as needed for other (pain) 60 capsule 0      [DISCONTINUED] ondansetron (ZOFRAN) 4 MG tablet Take 2 tablets (8 mg) by mouth every 8 hours as needed for nausea or vomiting 30 tablet 0      [DISCONTINUED] prochlorperazine (COMPAZINE) 25 MG suppository Place 1 suppository (25 mg) rectally every 12 hours as needed for nausea or vomiting 14 suppository 0      [DISCONTINUED] promethazine (PHENERGAN) 12.5 MG tablet Take 2 tablets (25 mg) by mouth every 6 hours as needed for nausea 14 tablet 0      [DISCONTINUED] tamsulosin (FLOMAX) 0.4 MG capsule Take 1 capsule (0.4 mg) by mouth daily 30 capsule 1      [DISCONTINUED] tamsulosin (FLOMAX) 0.4 MG capsule Take 1 capsule (0.4 mg) by mouth daily 30 capsule 1              Discharge Medications:        Review of your medicines      START taking       Dose / Directions   ibuprofen 800 MG tablet  Commonly known as: ADVIL/MOTRIN  Used for: Vacuum-assisted vaginal delivery      Dose: 800 mg  Take 1 tablet (800 mg) by mouth every 6 hours as needed for other (cramping)  Quantity: 60 tablet  Refills: 1     senna-docusate 8.6-50 MG tablet  Commonly known as: SENOKOT-S/PERICOLACE  Used for: Vacuum-assisted vaginal delivery      Dose: 1 tablet  Take 1 tablet by mouth 2 times daily as needed for constipation  Quantity: 10 tablet  Refills: 1        CONTINUE these medicines which have NOT CHANGED      Dose / Directions   cholecalciferol 50 MCG (2000 UT) Caps      Dose: 2,000 Units  Take 2,000 Units by mouth daily  Refills: 0     DULoxetine 60 MG capsule  Commonly known as: CYMBALTA      Dose: 60 mg  Take 60 mg by mouth daily  Refills: 0     Prenatal Plus 27-1 MG Tabs      Dose: 1 tablet  Take 1 tablet by mouth daily  Refills: 0        STOP taking    acetaminophen 325 MG tablet  Commonly known as: TYLENOL        diphenhydrAMINE 25 MG capsule  Commonly known as: BENADRYL        ondansetron 4 MG tablet  Commonly known as: ZOFRAN        prochlorperazine 25 MG suppository  Commonly known as: COMPAZINE        promethazine 12.5 MG tablet  Commonly known as: PHENERGAN        tamsulosin 0.4 MG capsule  Commonly known as: FLOMAX              Where to get your medicines      These medications were sent to [facility] [address] [address]    Phone: [phone]     ibuprofen 800 MG tablet    senna-docusate 8.6-50 MG tablet                Consultations:   Social Work           Brief Admission History and Intrapartum Course:     Indication for operative delivery: Fetal heart rate: recurrent variable decelerations      Adequate anesthesia was established with epidural local anesthesia. The patient was already in the dorsal lithotomy position.     Fetal position was confirmed to be at +3-4 station with  confirmed rupture of membranes. A disposable rigid mushroom vacuum extraction cup was appiled to the fetal median flexion point. It was centered over the sagittal sutures approximately 3cm anterior to the posterior fontanelle. The area around the vacuum cup was palpated to confirm that there was no maternal tissue within the cup margin. With the start of the contraction, a vacuum seal was established at 40 cmHg, and gentle traction was applied. Advancement of the fetal head was noted with gentle traction applied to the device. A total of 2 pulls were performed, and 1 popoffs occurred. one reapplications were performed.  The vacuum was released upon  of the fetal head, and delivery was performed thereafter with the assistance of maternal expulsive efforts with contractions. Anterior shoulder delivered atraumatically by maternal expulsive efforts with the assistance of downward traction. The posterior shoulder was delivered with maternal expulsive efforts and upward traction. The remainder of the fetus delivered spontaneously. The infant was bulb-suctioned maternal abdomen on abdomen. The cord was clamped and cut. Cord blood for gases collected and sent. The infant was then passed to the awaiting pediatric team at bedside.     The placenta delivered spontaneously, intact, with a 3-vessel cord. To enhance uterine contractions, 20 units of IV Pitocin were administered. The vaginal vault and perineum were examined for any delivery-related injuries. A right vaginal laceration was repaired with a running locking stitch using a 3-0 vicryl. A labial minora laceration all the way up to the clitoris was repaired with a 4-0 vicryl in a running stitch fashion. No complications     At delivery, the infant was noted to be moving all extremities appropriately.  IHT=389 ml    Delivery Summary    Hazel Ponce MRN# [id number]   Age: 25 year old YOB: 1995          Rosario, Male-Hazel [[id number]]    Labor  Event Times    Labor onset date: 21 Onset time: 10:00 AM   Dilation complete date: 21 Complete time:  3:21 PM   Start pushing date/time: 2021 1530      Labor Length    1st Stage (hrs): 5 (min): 21   2nd Stage (hrs): 0 (min): 35   3rd Stage (hrs): 0 (min): 15      Labor Events     labor?: No  Labor Type: Augmentation  Predominate monitoring during 1st stage: continuous electronic fetal monitoring     Antibiotics received during labor?: No     Rupture date/time: 21 0245   Rupture type: Spontaneous rupture of membranes occuring during spontaneous labor or augmentation  Fluid color: Clear  Fluid odor: Normal     Augmentation: Oxytocin  Augmentation date/time: 21 0846   Indications for augmentation: Ineffective Contraction Pattern  1:1 continuous labor support provided by?: RN       Delivery/Placenta Date and Time    Delivery Date: 21 Delivery Time:  3:56 PM   Placenta Date/Time: 2021  4:11 PM  Oxytocin given at the time of delivery: after delivery of placenta  Delivering clinician: Jory Rangel MD   Other personnel present at delivery:  Provider Role   Jory Rangel MD Longie-Anderson, Kimberly, RN Delivery Nurse   Maki Aguirre RN Registered Nurse         Vaginal Counts     Initial count performed by 2 team members:  Two Team Members   Dr Juan Carlos Bonner, RN       Needles Suture Needles Sponges (RETIRED) Instruments   Initial counts 2  5    Added to count  3 5    Relief counts       Final counts 2 3 10          Placed during labor Accounted for at the end of labor   FSE No NA   IUPC No NA   Cervadil No NA         Relief count performed by 2 team members:  Two Team Members   RITA Vicente       Final count performed by 2 team members:  Two Team Members   Dr Juan Carlos Aguirre RNC      Final count correct?: Yes     Apgars    Living status: Living   1 Minute 5 Minute 10 Minute 15 Minute 20 Minute   Skin color: 0  0       Heart rate: 2  2       Reflex  "irritability: 2  2       Muscle tone: 2  2       Respiratory effort: 1  2       Total: 7  8       Apgars assigned by: ABRAN MCKINLEY     Cord    Vessels: 3 Vessels    Cord Complications: None               Cord Blood Disposition: Lab    Gases Sent?: Yes    Delayed cord clamping?: Yes    Cord Clamping Delay (seconds): 31-60 seconds    Stem cell collection?: No        Resuscitation    Output in Delivery Room: Stool     [place] Measurements    Weight: 6 lb 8.1 oz Length: 1' 8.5\"   Head circumference: 33 cm    Output in delivery room: Stool     Skin to Skin and Feeding Plan    Skin to skin initiation date/time: 1841    Skin to skin with: Mother  Skin to skin end date/time:     Breastfeeding initiated date/time: 2021 165     Labor Events and Shoulder Dystocia    Fetal Tracing Prior to Delivery: Category 2  Fetal Tracing Comments: recurrent variable decelerations, moderate variability and accelerations were maintained throughout   Shoulder dystocia present?: Neg     Delivery (Maternal) (Provider to Complete) ([id number])    Episiotomy: None  Perineal lacerations: None    Labial laceration: right Repaired?: Yes   Vaginal laceration?: Yes Repaired?: Yes   Repair suture: 3-0 Vicryl, 4-0 Vicryl  Genital tract inspection done: Pos     Blood Loss  Mother: Hazel Ponce Rosario #[id number]   Start of Mother's Information    IO Blood Loss  21 1000 - 21 0819    Delivery QBL (mL) Hospital Encounter 754 mL    Total  754 mL         End of Mother's Information  Mother: Hazel Ponce Rosario #[id number]          Delivery - Provider to Complete (819992)    Delivering clinician: Jory Rangel MD  Attempted Delivery Types (Choose all that apply): Spontaneous Vaginal Delivery  Delivery Type (Choose the 1 that will go to the Birth History): , Vaginal Vacuum    Verbal Informed Consent Obtained For Vacuum: Yes     Emergency Resources Available (vacuum): Resuscitation Team   Type (vacuum): Kiwi       # of Pop-Offs " (vacuum): 1    Position (vacuum): OA Fetal Station (vacuum): +3, +4      Indication for Operative Vaginal Delivery (vacuum): Fetal Status   Operative Vaginal Delivery Brief Note Vacuum: Please see vaginal delivery for further details             Other personnel:  Provider Role   Jory Rangel MD Longie-Anderson, Kimberly, RN Delivery Nurse   Maki Aguirre, RN Registered Nurse                Placenta    Date/Time: 5/28/2021  4:11 PM  Removal: Spontaneous  Disposition: Hospital disposal           Anesthesia    Method: Epidural  Cervical dilation at placement: 4-7                Presentation and Position    Presentation: Vertex    Position: Left Occiput Anterior                        Post-partum Course:   The patient's hospital course was unremarkable.  On discharge, her pain was well controlled. Vaginal bleeding is similar to peak menstrual flow.  Voiding without difficulty.  Ambulating well and tolerating a normal diet.  No fever.  Breastfeeding well.  Infant is stable.  She was discharged on post-partum day #2.    Post-partum hemoglobin: 10.0 (consistent with acute blood loss anemia, asymptomatic) pt will continue on prenatal vitamins.           Discharge Instructions and Follow-Up:     Discharge diet: Regular   Discharge activity: Pelvic rest for 6 weeks including no sexual intercourse, tampons, or douching.    Discharge follow-up: Follow up with your primary OB for a routine postpartum visit in 6 weeks and in 2 weeks for a mood check           Discharge Disposition:     Discharged to home      .Dr. Antelmo Rangel  [phone]

## 2021-05-29 NOTE — PLAN OF CARE
Pt s/p ; VS and assessment WNL.  Pt voiding without difficulty and tolerating activity.  Pain well managed.  Pt initially breastfeeding infant, but decided she wanted to switch to bottle feeding formula - discussed with pt.  Pt states she may try breastfeeding again once they are home; encouraged pt to begin pumping if her goals are to continue with breastfeeding.  Pt states she will let RN know if she would like to begin pumping; discussed benefits/importance of stimulation - will support mother's feeding goals for infant.  Pt had lactation consult placed earlier today, pt declined to speak with lactation this evening.  Positive bonding observed with infant.  Pt resting well and meeting expected goals. FOB at bedside and supportive of couplet.  Pt stable; continue with current plan of care.

## 2021-05-29 NOTE — PLAN OF CARE
Patient stable this shift.  Independent with self and  cares.  FOB present and supportive.  Utilizing pain medications as needed.  Meeting all expected goals, see flowsheets.  Patient reports anxiety and is on Cymbalta. OB ordered for SW consult.  Pt. Depression score 0.  Patient states she has seen a psychologist in the past but not for some time now.  Instructed to review the depression screen 2 weeks PP and to call OB clinic if concerns.  Patient is hoping for a 24 hour discharge today but is open to staying if infant needs further care.

## 2021-05-29 NOTE — CONSULTS
Care Management Initial Consult    General Information  Assessment completed with: Patient,    Type of CM/SW Visit: Initial Assessment    Primary Care Provider verified and updated as needed:     Readmission within the last 30 days:        Reason for Consult: discharge planning, mental health concerns  Advance Care Planning: Advance Care Planning Reviewed: no concerns identified          Communication Assessment  Patient's communication style: spoken language (English or Bilingual)    Hearing Difficulty or Deaf: no   Wear Glasses or Blind: no    Cognitive  Cognitive/Neuro/Behavioral: WDL                      Living Environment:   People in home: significant other     Current living Arrangements:house with significant other       Able to return to prior arrangements: yes       Family/Social Support:  Mother is supportive.      Significant Other, Parent(s), Other (specify)(best friend)          Description of Support System: Supportive, Involved    Support Assessment: Adequate family and caregiver support, Adequate social supports    Current Resources:   Patient receiving home care services: No     Community Resources: None  Equipment currently used at home: none      Employment/Financial:  Employment Status: employed full-time as a . Will take the summer off and will find a benefit earning job when she is able to work.     Financial Concerns: No concerns identified   Pt is on her mother's insurance.   Pt is not on WIC. Pt is familiar but does not think she qualifies. Pt does not think se needs WIC support.     Lifestyle & Psychosocial Needs:        Socioeconomic History     Marital status: Single     Spouse name: Not on file     Number of children: Not on file     Years of education: Not on file     Highest education level: Not on file     Tobacco Use     Smoking status: Former Smoker     Smokeless tobacco: Never Used   Substance and Sexual Activity     Alcohol use: Not Currently     Drug use: No       Mental  Health Status:  Mental Health Status: Current Concern  Mental Health Management: Medication, Psychiatrist  Medication check is scheduled in August.   Pt typically has high anxiety and worries a lot. Sometimes pt experiences irritability. Pt tends to keep busy and talk things out when she is symptomatic. Pt's significant other/FOB is supportive. Pt also has the support of her mother (who she talks to every day) and her best friend.    Chemical Dependency Status:  Chemical Dependency Status: No Current Concerns           Values/Beliefs:  Spiritual, Cultural Beliefs, Temple Practices, Values that affect care: no               Additional Information:  Pt was asleep upon arrival but was agreeable to meeting with SW. Baby Shashi was asleep in the bassinet nearby.   Pt lives with her significant other/FOB and his 3 year old son who they have 50% of the time.   SW reviewed postpartum depression and anxiety with pt and gave pt a pamphlet.  SW gave pt a pamphlet with community resources.   Pt denied any questions or concerns.     KAYLEN Shabazz, LGSW  Casual    [facility]  [phone]

## 2021-05-29 NOTE — PLAN OF CARE
Patient vital signs stable and meeting expected outcomes.  Breastfeeding with some assistance with positioning and latch,  sleepy at breast.  Up independently and voiding adequately.  Pain well controlled with ibuprofen.   Upper back/shoulders and posterior neck covered with possible broken blood vessels, denies itching, pain or increase in area.  Able to perform all cares for self and infant.  Bonding well with baby.  FOB present and supportive at bedside.  Will continue to monitor.

## 2021-05-29 NOTE — ANESTHESIA POSTPROCEDURE EVALUATION
Patient: Hazel Ponce    * No procedures listed *    Diagnosis:* No pre-op diagnosis entered *  Diagnosis Additional Information: No value filed.    Anesthesia Type:  Epidural    Note:     Postop Pain Control:    PONV:    Neuro/Psych:    Airway/Respiratory:    CV/Hemodynamics:    Other NRE:    DID A NON-ROUTINE EVENT OCCUR?     Event details/Postop Comments:      S/P epidural for labor.   I or my partner was immediately available for management of this patient during epidural analgesia infusion.  VSS.  Doing well. Block resolved.  Neuro at baseline. Denies positional headache. Minimal side effects easily managed w/ PRN meds. No apparent anesthetic complications. No follow-up required.    Attila Cordoba MD           Last vitals:  Vitals:    05/28/21 1749 05/28/21 1803 05/29/21 0100   BP: 116/57 113/61 121/68   Pulse:   101   Resp:   16   Temp:   98.4  F (36.9  C)   SpO2:          Last vitals prior to Anesthesia Care Transfer:      Electronically Signed By: Attila Cordoba MD  May 29, 2021  7:04 AM

## 2021-05-30 VITALS
OXYGEN SATURATION: 88 % | DIASTOLIC BLOOD PRESSURE: 69 MMHG | BODY MASS INDEX: 31.36 KG/M2 | WEIGHT: 177 LBS | HEART RATE: 82 BPM | HEIGHT: 63 IN | RESPIRATION RATE: 18 BRPM | SYSTOLIC BLOOD PRESSURE: 120 MMHG | TEMPERATURE: 98.4 F

## 2021-05-30 PROCEDURE — 250N000013 HC RX MED GY IP 250 OP 250 PS 637: Performed by: OBSTETRICS & GYNECOLOGY

## 2021-05-30 RX ADMIN — PRENATAL VITAMINS-IRON FUMARATE 27 MG IRON-FOLIC ACID 0.8 MG TABLET 1 TABLET: at 09:46

## 2021-05-30 RX ADMIN — IBUPROFEN 800 MG: 800 TABLET, FILM COATED ORAL at 00:08

## 2021-05-30 RX ADMIN — IBUPROFEN 800 MG: 800 TABLET, FILM COATED ORAL at 06:55

## 2021-05-30 RX ADMIN — DOCUSATE SODIUM 50 MG AND SENNOSIDES 8.6 MG 1 TABLET: 8.6; 5 TABLET, FILM COATED ORAL at 09:40

## 2021-05-30 RX ADMIN — DULOXETINE HYDROCHLORIDE 60 MG: 60 CAPSULE, DELAYED RELEASE ORAL at 09:46

## 2021-05-30 NOTE — PLAN OF CARE
Patient VSS. Ambulating and independent with cares. Pain is well managed with PRN Ibuprofen and Tylenol. Bottle feeding infant formula per patient's preference. Parents are attentive to infant needs and bonding well with infant.

## 2021-05-30 NOTE — DISCHARGE INSTRUCTIONS
Postpartum Vaginal Delivery Instructions    Make an appointment to be seen in 6 weeks at doctors office    Activity       Ask family and friends for help when you need it.    Do not place anything in your vagina for 6 weeks.    You are not restricted on other activities, but take it easy for a few weeks to allow your body to recover from delivery.  You are able to do any activities you feel up to that point.    No driving until you have stopped taking your pain medications (usually two weeks after delivery).     Call your health care provider if you have any of these symptoms:       Increased pain, swelling, redness, or fluid around your stiches from an episiotomy or perineal tear.    A fever above 100.4 F (38 C) with or without chills when placing a thermometer under your tongue.    You soak a sanitary pad with blood within 1 hour, or you see blood clots larger than a golf ball.    Bleeding that lasts more than 6 weeks.    Vaginal discharge that smells bad.    Severe pain, cramping or tenderness in your lower belly area.    A need to urinate more frequently (use the toilet more often), more urgently (use the toilet very quickly), or it burns when you urinate.    Nausea and vomiting.    Redness, swelling or pain around a vein in your leg.    Problems breastfeeding or a red or painful area on your breast.    Chest pain and cough or are gasping for air.    Problems coping with sadness, anxiety, or depression.  If you have any concerns about hurting yourself or the baby, call your provider immediately.     You have questions or concerns after you return home.     Keep your hands clean:  Always wash your hands before touching your perineal area and stitches.  This helps reduce your risk of infection.  If your hands aren't dirty, you may use an alcohol hand-rub to clean your hands. Keep your nails clean and short.

## 2021-05-30 NOTE — PROGRESS NOTES
[facility]  PPD# 2    Pt doing well, states she is ready to go home. Ambulating, voiding, tolerating PO. Decreased lochia. Pain controlled. Breast feeding and coping well with infant.    Vitals:    21 0900 21 1701 21 0000 21 0940   BP: 107/62 109/80 105/53 120/69   Pulse: 80 81 75 82   Resp: 16 18 17 18   Temp: 98.4  F (36.9  C) 97.5  F (36.4  C) 97.9  F (36.6  C) 98.4  F (36.9  C)   TempSrc: Oral Oral Oral Oral   SpO2:       Weight:       Height:         Abd soft, appropriately tender, nondistended, FFBU, no fundal tenderness  Ext 1+ edema bilat LE, no CT BL    Lab Results   Component Value Date    HGB 10.0 2021       A/P 25 year old  at 39w1d s/p  PPD# 2.    Her prenatal course has been complicated by   - Mood disorder on cymbalta  - R sided Kidney colic; hx of R nephrolithiasis  - Hx of MRSA with negative repeat swabs  - Hep B NI status      Pain:    Well-controlled with ibuprofen and tylenol  Hgb:     12.3>QBL 754cc> 10.0. VSS as noted above, asymptomatic. Continue prenatal vitamins  GI:       BID Senna/Colace.  PRN Simethicone.   PPx:     Encouraged ambulation   Rh:       Negative, baby Rh positive. Rhogam given  Rubella: Immune   Feed:   Breast   BC:      POPs  Other:   - MDD on Cymbalta, has psychiatrist.  Will plan on 2 week mood check     Dispo:  discharge home. 6 wk PP visit.      Dr. Antelmo Rangel  [phone]

## 2021-05-30 NOTE — PLAN OF CARE
Data: Vital signs within normal limits. Postpartum checks within normal limits - see flow record. Patient eating and drinking normally. Patient able to empty bladder independently and is up ambulating. No apparent signs of infection. perineum healing well. Patient performing self cares and is able to care for infant.  Action: No pain medication given during the shift for pain and cramping. - patient stated she was comfortable. Patient education done about self and infant cares and discharge instructions. See flow record.  Response: Positive attachment behaviors observed with infant. Support persons  present.   Plan: Anticipate discharge on today.

## 2021-10-24 ENCOUNTER — HEALTH MAINTENANCE LETTER (OUTPATIENT)
Age: 26
End: 2021-10-24

## 2021-11-21 ENCOUNTER — APPOINTMENT (OUTPATIENT)
Dept: GENERAL RADIOLOGY | Facility: CLINIC | Age: 26
End: 2021-11-21
Attending: PHYSICIAN ASSISTANT
Payer: COMMERCIAL

## 2021-11-21 ENCOUNTER — HOSPITAL ENCOUNTER (EMERGENCY)
Facility: CLINIC | Age: 26
Discharge: HOME | End: 2021-11-21
Attending: PHYSICIAN ASSISTANT | Admitting: PHYSICIAN ASSISTANT
Payer: COMMERCIAL

## 2021-11-21 VITALS
DIASTOLIC BLOOD PRESSURE: 102 MMHG | SYSTOLIC BLOOD PRESSURE: 162 MMHG | TEMPERATURE: 97.9 F | RESPIRATION RATE: 16 BRPM | OXYGEN SATURATION: 99 % | HEART RATE: 114 BPM
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
## 2021-11-21 DIAGNOSIS — S81.011A KNEE LACERATION, RIGHT, INITIAL ENCOUNTER: ICD-10-CM

## 2021-11-21 PROCEDURE — 73562 X-RAY EXAM OF KNEE 3: CPT | Mod: RT

## 2021-11-21 PROCEDURE — 73590 X-RAY EXAM OF LOWER LEG: CPT | Mod: RT

## 2021-11-21 PROCEDURE — 99284 EMERGENCY DEPT VISIT MOD MDM: CPT | Mod: 25

## 2021-11-21 PROCEDURE — 12034 INTMD RPR S/TR/EXT 7.6-12.5: CPT

## 2021-11-21 RX ORDER — CEPHALEXIN 500 MG/1
500 CAPSULE ORAL 4 TIMES DAILY
Qty: 20 CAPSULE | Refills: 0 | Status: SHIPPED | OUTPATIENT
Start: 2021-11-21 | End: 2021-11-26

## 2021-11-21 ASSESSMENT — ENCOUNTER SYMPTOMS: WOUND: 1

## 2021-11-22 NOTE — ED NOTES
Bactracin, adaptic, and rolled gauze applied to knee as well as ace bandage as advised by provider. Pt tolerated well.

## 2021-11-22 NOTE — ED PROVIDER NOTES
History   Chief Complaint:  Leg Injury       HPI   Hazel Ponce is a 26 year old female who presents with a right below calf laceration after falling on a ridge between the driveway and street when trying to get the mail in her boyfriends shoes. She does not feel pain but pressure. She is able to  her leg. Tdap 2021. Of note, she had a baby recently.     Review of Systems   Skin: Positive for wound.   All other systems reviewed and are negative.      Allergies:  The patient has no known allergies.       Medications:  Cymbalta  Senna-docusate    Past Medical History:     Depression  MRSA  Nephrolithiasis  Kidney stones      Past Surgical History:    [place] teeth extraction    Social History:  The patient presents with mother  She works as a     Physical Exam     Patient Vitals for the past 24 hrs:   BP Temp Pulse Resp SpO2   11/21/21 2035 (!) 162/102 97.9  F (36.6  C) 114 16 99 %       Physical Exam  HENT: mmm  Eyes: PERRL B/L  Neck: Supple  CV: Peripheral pulses in tact and regular  Resp: Speaking in full sentences without any respiratory distress  Ext:  Right lower extremity  8cm laceration just inferior to patella. No active bleeding.  No bony TTP.  Full ROM and normal strength to knee, ankle, and toes.  Sensation and perfusion intact throughout foot  Remainder of the skeletal survey is unremarkable  Skin: warm dry well perfused  Neuro: Alert, no gross motor or sensory deficits  Psych: Calm  Nursing notes and vital signs reviewed.      Emergency Department Course     Imaging:  XR Tibia & Fibula Right 2 Views   Final Result   IMPRESSION: Normal right tibia and fibula. No fracture.      XR Knee Right 3 Views   Final Result   IMPRESSION: The right knee is negative for fracture or compartmental narrowing. No effusion. There is a soft tissue defect anteriorly consistent with surface wound/laceration. No foreign body.        Report per radiology    Procedures    Laceration Repair        LACERATION:   A subcutaneous minimally Contaminated 8 cm laceration.      LOCATION:  Inferior to right patella      FUNCTION:  Distally sensation, circulation, motor and tendon function are intact.      ANESTHESIA:  Local using 1% lidocaine with Epi total of 10 mLs      PREPARATION:  Irrigation and Scrubbing with Normal Saline and Shur Clens      DEBRIDEMENT:  no debridement and wound explored without evidence of foreign body      CLOSURE:  Wound was closed with Two Layers: Subcutaneous layer closed with 3 x 4.0 Vicryl rapide Sutures. Skin closed with 13 x 4.0 Ethylon using interrupted sutures    Emergency Department Course:  Reviewed:  I reviewed nursing notes, vitals, past medical history and Care Everywhere    Assessments:  2050 I obtained history and examined the patient as noted above.     2215 I rechecked the patient and explained findings. Performed laceration repair.     Disposition:  The patient was discharged to home.     Impression & Plan       Medical Decision Making:  Hazel Ponce is a 26 year old female who presents for evaluation of a laceration to the right leg. See HPI as above for additional details. Vitals and physical exam as above. Given mechanism, imaging obtained as above without evidence for bony abnormality. The wound was carefully evaluated and explored.  The laceration was closed as noted above.  There is no evidence of muscular, tendon, or bony damage with this laceration.  No signs of foreign body.  Possible complications (infection, scarring) were reviewed with the patient. Tetanus UTD. Given mechanism, will send home with short course of prophylactic antibiotics. Tylenol and ibuprofen for pain. Follow up with primary care as noted in the discharge section. Discussed reasons to return. All questions answered. Patient discharged to home in stable condition.    Diagnosis:    ICD-10-CM    1. Fall, initial encounter  W19.XXXA    2. Knee laceration, right, initial encounter  S81.011A        Discharge  Medications:  Discharge Medication List as of 11/21/2021 10:23 PM      START taking these medications    Details   cephALEXin (KEFLEX) 500 MG capsule Take 1 capsule (500 mg) by mouth 4 times daily for 5 days, Disp-20 capsule, R-0, Local Print             Scribe Disclosure:  I, Mallorie Palmer, am serving as a scribe at 8:40 PM on 11/21/2021 to document services personally performed by Harvey Washington PA-C based on my observations and the provider's statements to me.     This record was created at least in part using electronic voice recognition software, so please excuse any typographical errors.         Harvey Washington PA-C  11/21/21 3530

## 2021-11-22 NOTE — DISCHARGE INSTRUCTIONS
Take the full course of Keflex.  Keep leg straight for the next few days to ensure that wound has time to start to heal appropriately.    Get sutures removed at primary care in 10 days.    Use Tylenol and ibuprofen for pain.

## 2022-02-13 ENCOUNTER — HEALTH MAINTENANCE LETTER (OUTPATIENT)
Age: 27
End: 2022-02-13

## 2022-10-15 ENCOUNTER — HEALTH MAINTENANCE LETTER (OUTPATIENT)
Age: 27
End: 2022-10-15

## 2022-11-28 ENCOUNTER — HOSPITAL ENCOUNTER (OUTPATIENT)
Facility: CLINIC | Age: 27
Discharge: HOME | End: 2022-11-28
Attending: STUDENT IN AN ORGANIZED HEALTH CARE EDUCATION/TRAINING PROGRAM | Admitting: STUDENT IN AN ORGANIZED HEALTH CARE EDUCATION/TRAINING PROGRAM
Payer: COMMERCIAL

## 2022-11-28 ENCOUNTER — HOSPITAL ENCOUNTER (OUTPATIENT)
Facility: CLINIC | Age: 27
End: 2022-11-28
Admitting: STUDENT IN AN ORGANIZED HEALTH CARE EDUCATION/TRAINING PROGRAM
Payer: COMMERCIAL

## 2022-11-28 VITALS — RESPIRATION RATE: 18 BRPM | DIASTOLIC BLOOD PRESSURE: 79 MMHG | SYSTOLIC BLOOD PRESSURE: 123 MMHG | TEMPERATURE: 98.1 F

## 2022-11-28 DIAGNOSIS — N20.0 NEPHROLITHIASIS: Primary | ICD-10-CM

## 2022-11-28 PROCEDURE — 999N000105 HC STATISTIC NO DOCUMENTATION TO SUPPORT CHARGE

## 2022-11-28 PROCEDURE — G0463 HOSPITAL OUTPT CLINIC VISIT: HCPCS

## 2022-11-28 PROCEDURE — 258N000003 HC RX IP 258 OP 636: Performed by: STUDENT IN AN ORGANIZED HEALTH CARE EDUCATION/TRAINING PROGRAM

## 2022-11-28 PROCEDURE — 96360 HYDRATION IV INFUSION INIT: CPT

## 2022-11-28 RX ORDER — POLYETHYLENE GLYCOL 3350 17 G/17G
17 POWDER, FOR SOLUTION ORAL DAILY
Qty: 510 G | Refills: 0 | Status: ON HOLD | COMMUNITY
Start: 2022-11-28 | End: 2023-03-25

## 2022-11-28 RX ORDER — SENNA AND DOCUSATE SODIUM 50; 8.6 MG/1; MG/1
1 TABLET, FILM COATED ORAL 2 TIMES DAILY PRN
Qty: 30 TABLET | Refills: 0 | Status: ON HOLD | COMMUNITY
Start: 2022-11-28 | End: 2023-03-25

## 2022-11-28 RX ORDER — ACETAMINOPHEN 325 MG/1
650 TABLET ORAL EVERY 4 HOURS PRN
Status: ON HOLD | COMMUNITY
Start: 2022-11-28 | End: 2023-03-27

## 2022-11-28 RX ORDER — OXYCODONE HYDROCHLORIDE 5 MG/1
TABLET ORAL
Status: ON HOLD | COMMUNITY
Start: 2022-11-25 | End: 2023-03-25

## 2022-11-28 RX ORDER — ACETAMINOPHEN 325 MG/1
650 TABLET ORAL EVERY 4 HOURS PRN
Status: DISCONTINUED | OUTPATIENT
Start: 2022-11-28 | End: 2022-11-28 | Stop reason: HOSPADM

## 2022-11-28 RX ORDER — AMOXICILLIN 250 MG
1 CAPSULE ORAL 2 TIMES DAILY PRN
Status: DISCONTINUED | OUTPATIENT
Start: 2022-11-28 | End: 2022-11-28 | Stop reason: HOSPADM

## 2022-11-28 RX ORDER — OXYCODONE HYDROCHLORIDE 5 MG/1
5 TABLET ORAL
Status: ON HOLD | COMMUNITY
Start: 2022-11-28 | End: 2022-11-28

## 2022-11-28 RX ORDER — DULOXETIN HYDROCHLORIDE 20 MG/1
CAPSULE, DELAYED RELEASE ORAL
Status: ON HOLD | COMMUNITY
Start: 2022-06-13 | End: 2023-03-25

## 2022-11-28 RX ADMIN — SODIUM CHLORIDE, POTASSIUM CHLORIDE, SODIUM LACTATE AND CALCIUM CHLORIDE 1000 ML: 600; 310; 30; 20 INJECTION, SOLUTION INTRAVENOUS at 19:38

## 2022-11-28 ASSESSMENT — ACTIVITIES OF DAILY LIVING (ADL)
ADLS_ACUITY_SCORE: 18
ADLS_ACUITY_SCORE: 31

## 2022-11-29 NOTE — DISCHARGE INSTRUCTIONS
A urology consult was placed through [facility]. Please call [phone] in the AM to schedule.

## 2022-11-29 NOTE — PROVIDER NOTIFICATION
11/28/22 2105   Provider Notification   Provider Name/Title Dr. Matson   Method of Notification Phone   Request Evaluate - Remote   Notification Reason Status Update   Patient reports no increase in pain and states that she would like to follow up in clinic on Wednesday as scheduled. Dr. Matson informed of patient status and ordered pt to be discharged, alternate tylenol with oxycodone for pain, take senna colace or miralax and follow up in clinic as scheduled. Pt agreeable to plan.

## 2022-11-29 NOTE — PROVIDER NOTIFICATION
11/28/22 1918   Provider Notification   Provider Name/Title Dr. Matson   Method of Notification Phone   Request Evaluate - Remote   Notification Reason Patient Arrived   Nurse reviewed prenatal hx and kidney stone complaint with Dr. Matson. Dr. Matson informed of patient experiencing constipation and that her pain is well controlled with oxycodone. Dr. Matson ordered IV fluids, senna colace, put in a urology consult and wants patient to follow up in clinic on Wednesday as scheduled. Pt agreeable to plan.

## 2022-11-29 NOTE — PROGRESS NOTES
Data: Patient assessed in the [facility] for kidney stone.Membranes intact. Pt denies contractions. Resting tone soft by palpation.  Action:  FHR monitored with doppler-see flow sheet. Discharge instructions reviewed.  Patient instructed to report change in urine, pain that is unresolved with current medications, change in fetal movement, vaginal leaking of fluid or bleeding, abdominal pain, or any concerns related to the pregnancy to her nurse/physician.    Response: Orders to discharge home per Sheila Matson.  Patient verbalized understanding of education and verbalized agreement with plan. Discharged to home at 9:23pm.

## 2022-11-29 NOTE — PROGRESS NOTES
Data: Patient presented to [facility]: 2022  6:33 PM.  Reason for maternal/fetal assessment is kidney stone. Patient reports pink urine, pain due to kidney stone.  Patient is a .  Prenatal record reviewed. Pregnancy  has been complicated by kidney stone.  Gestational Age Unknown. VSS. Fetal movement active. Patient denies uterine contractions, leaking of vaginal fluid/rupture of membranes, vaginal bleeding, abdominal pain, pelvic pressure, nausea, vomiting, headache, visual disturbances, epigastric or URQ pain, significant edema. Support person is present.   Action: Verbal consent for EFM. Triage assessment completed. Bill of rights reviewed.  Response: Patient verbalized agreement with plan. Will contact Dr Sheila Matson with update and for further orders.

## 2023-01-27 ENCOUNTER — HOSPITAL ENCOUNTER (OUTPATIENT)
Facility: CLINIC | Age: 28
Discharge: HOME | End: 2023-01-27
Attending: OBSTETRICS & GYNECOLOGY | Admitting: OBSTETRICS & GYNECOLOGY
Payer: COMMERCIAL

## 2023-01-27 ENCOUNTER — HOSPITAL ENCOUNTER (EMERGENCY)
Facility: CLINIC | Age: 28
End: 2023-01-27
Payer: COMMERCIAL

## 2023-01-27 ENCOUNTER — APPOINTMENT (OUTPATIENT)
Dept: ULTRASOUND IMAGING | Facility: CLINIC | Age: 28
End: 2023-01-27
Attending: OBSTETRICS & GYNECOLOGY
Payer: COMMERCIAL

## 2023-01-27 ENCOUNTER — HOSPITAL ENCOUNTER (OUTPATIENT)
Facility: CLINIC | Age: 28
End: 2023-01-27
Admitting: OBSTETRICS & GYNECOLOGY
Payer: COMMERCIAL

## 2023-01-27 VITALS
HEIGHT: 62 IN | RESPIRATION RATE: 18 BRPM | BODY MASS INDEX: 33.31 KG/M2 | TEMPERATURE: 98.1 F | DIASTOLIC BLOOD PRESSURE: 72 MMHG | SYSTOLIC BLOOD PRESSURE: 132 MMHG | WEIGHT: 181 LBS

## 2023-01-27 VITALS
HEART RATE: 99 BPM | OXYGEN SATURATION: 100 % | TEMPERATURE: 97.8 F | DIASTOLIC BLOOD PRESSURE: 89 MMHG | RESPIRATION RATE: 16 BRPM | SYSTOLIC BLOOD PRESSURE: 129 MMHG

## 2023-01-27 DIAGNOSIS — N20.0 NEPHROLITHIASIS: ICD-10-CM

## 2023-01-27 DIAGNOSIS — N20.0 KIDNEY STONES: Primary | ICD-10-CM

## 2023-01-27 LAB
ALBUMIN SERPL BCG-MCNC: 3.4 G/DL (ref 3.5–5.2)
ALBUMIN UR-MCNC: 10 MG/DL
ALP SERPL-CCNC: 103 U/L (ref 35–104)
ALT SERPL W P-5'-P-CCNC: 21 U/L (ref 10–35)
ANION GAP SERPL CALCULATED.3IONS-SCNC: 11 MMOL/L (ref 7–15)
APPEARANCE UR: ABNORMAL
AST SERPL W P-5'-P-CCNC: 17 U/L (ref 10–35)
BASOPHILS # BLD AUTO: 0 10E3/UL (ref 0–0.2)
BASOPHILS NFR BLD AUTO: 0 %
BILIRUB SERPL-MCNC: 0.2 MG/DL
BILIRUB UR QL STRIP: NEGATIVE
BUN SERPL-MCNC: 11 MG/DL (ref 6–20)
CALCIUM SERPL-MCNC: 8.5 MG/DL (ref 8.6–10)
CHLORIDE SERPL-SCNC: 103 MMOL/L (ref 98–107)
COLOR UR AUTO: ABNORMAL
CREAT SERPL-MCNC: 0.73 MG/DL (ref 0.51–0.95)
DEPRECATED HCO3 PLAS-SCNC: 21 MMOL/L (ref 22–29)
EOSINOPHIL # BLD AUTO: 0.1 10E3/UL (ref 0–0.7)
EOSINOPHIL NFR BLD AUTO: 1 %
ERYTHROCYTE [DISTWIDTH] IN BLOOD BY AUTOMATED COUNT: 13.9 % (ref 10–15)
GFR SERPL CREATININE-BSD FRML MDRD: >90 ML/MIN/1.73M2
GLUCOSE SERPL-MCNC: 87 MG/DL (ref 70–99)
GLUCOSE UR STRIP-MCNC: NEGATIVE MG/DL
HCT VFR BLD AUTO: 37.4 % (ref 35–47)
HGB BLD-MCNC: 12.2 G/DL (ref 11.7–15.7)
HGB UR QL STRIP: NEGATIVE
IMM GRANULOCYTES # BLD: 0.1 10E3/UL
IMM GRANULOCYTES NFR BLD: 0 %
KETONES UR STRIP-MCNC: NEGATIVE MG/DL
LEUKOCYTE ESTERASE UR QL STRIP: NEGATIVE
LYMPHOCYTES # BLD AUTO: 1.9 10E3/UL (ref 0.8–5.3)
LYMPHOCYTES NFR BLD AUTO: 13 %
MCH RBC QN AUTO: 27.5 PG (ref 26.5–33)
MCHC RBC AUTO-ENTMCNC: 32.6 G/DL (ref 31.5–36.5)
MCV RBC AUTO: 84 FL (ref 78–100)
MONOCYTES # BLD AUTO: 0.5 10E3/UL (ref 0–1.3)
MONOCYTES NFR BLD AUTO: 3 %
MUCOUS THREADS #/AREA URNS LPF: PRESENT /LPF
NEUTROPHILS # BLD AUTO: 12 10E3/UL (ref 1.6–8.3)
NEUTROPHILS NFR BLD AUTO: 83 %
NITRATE UR QL: NEGATIVE
NRBC # BLD AUTO: 0 10E3/UL
NRBC BLD AUTO-RTO: 0 /100
PH UR STRIP: 8 [PH] (ref 5–7)
PLATELET # BLD AUTO: 307 10E3/UL (ref 150–450)
POTASSIUM SERPL-SCNC: 3.9 MMOL/L (ref 3.4–5.3)
PROT SERPL-MCNC: 6.9 G/DL (ref 6.4–8.3)
RBC # BLD AUTO: 4.43 10E6/UL (ref 3.8–5.2)
RBC URINE: 1 /HPF
SODIUM SERPL-SCNC: 135 MMOL/L (ref 136–145)
SP GR UR STRIP: 1.02 (ref 1–1.03)
SQUAMOUS EPITHELIAL: 8 /HPF
UROBILINOGEN UR STRIP-MCNC: NORMAL MG/DL
WBC # BLD AUTO: 14.5 10E3/UL (ref 4–11)
WBC URINE: 1 /HPF

## 2023-01-27 PROCEDURE — 250N000013 HC RX MED GY IP 250 OP 250 PS 637: Performed by: OBSTETRICS & GYNECOLOGY

## 2023-01-27 PROCEDURE — 81001 URINALYSIS AUTO W/SCOPE: CPT | Performed by: OBSTETRICS & GYNECOLOGY

## 2023-01-27 PROCEDURE — 59025 FETAL NON-STRESS TEST: CPT

## 2023-01-27 PROCEDURE — 85025 COMPLETE CBC W/AUTO DIFF WBC: CPT | Performed by: OBSTETRICS & GYNECOLOGY

## 2023-01-27 PROCEDURE — G0463 HOSPITAL OUTPT CLINIC VISIT: HCPCS | Mod: 25

## 2023-01-27 PROCEDURE — 999N000105 HC STATISTIC NO DOCUMENTATION TO SUPPORT CHARGE

## 2023-01-27 PROCEDURE — 76770 US EXAM ABDO BACK WALL COMP: CPT

## 2023-01-27 PROCEDURE — 80053 COMPREHEN METABOLIC PANEL: CPT | Performed by: OBSTETRICS & GYNECOLOGY

## 2023-01-27 PROCEDURE — 36415 COLL VENOUS BLD VENIPUNCTURE: CPT | Performed by: OBSTETRICS & GYNECOLOGY

## 2023-01-27 RX ORDER — HYDROXYZINE HYDROCHLORIDE 50 MG/1
50-100 TABLET, FILM COATED ORAL EVERY 6 HOURS PRN
Status: DISCONTINUED | OUTPATIENT
Start: 2023-01-27 | End: 2023-01-27 | Stop reason: HOSPADM

## 2023-01-27 RX ORDER — HYDROXYZINE HYDROCHLORIDE 25 MG/1
TABLET, FILM COATED ORAL
COMMUNITY
Start: 2022-11-30 | End: 2023-01-27

## 2023-01-27 RX ORDER — LIDOCAINE 40 MG/G
CREAM TOPICAL
Status: DISCONTINUED | OUTPATIENT
Start: 2023-01-27 | End: 2023-01-27 | Stop reason: HOSPADM

## 2023-01-27 RX ORDER — HYDROXYZINE HYDROCHLORIDE 25 MG/1
50-100 TABLET, FILM COATED ORAL EVERY 8 HOURS PRN
Qty: 30 TABLET | Refills: 1 | Status: ON HOLD | OUTPATIENT
Start: 2023-01-27 | End: 2023-03-25

## 2023-01-27 RX ORDER — TAMSULOSIN HYDROCHLORIDE 0.4 MG/1
0.4 CAPSULE ORAL DAILY
Status: DISCONTINUED | OUTPATIENT
Start: 2023-01-27 | End: 2023-01-27 | Stop reason: HOSPADM

## 2023-01-27 RX ORDER — ACETAMINOPHEN 325 MG/1
975 TABLET ORAL EVERY 6 HOURS PRN
Status: DISCONTINUED | OUTPATIENT
Start: 2023-01-27 | End: 2023-01-27 | Stop reason: HOSPADM

## 2023-01-27 RX ADMIN — TAMSULOSIN HYDROCHLORIDE 0.4 MG: 0.4 CAPSULE ORAL at 17:08

## 2023-01-27 RX ADMIN — ACETAMINOPHEN 975 MG: 325 TABLET, FILM COATED ORAL at 15:37

## 2023-01-27 RX ADMIN — HYDROXYZINE HYDROCHLORIDE 100 MG: 50 TABLET ORAL at 15:40

## 2023-01-27 ASSESSMENT — ACTIVITIES OF DAILY LIVING (ADL)
ADLS_ACUITY_SCORE: 31
ADLS_ACUITY_SCORE: 31

## 2023-01-27 NOTE — PROVIDER NOTIFICATION
01/27/23 1725   Provider Notification   Provider Name/Title Dr Peterson   Method of Notification At Bedside   Notification Reason Status Update      at bedside, discussing results and POC. Reviewed complete EFM tracing. Order for discharge to home.

## 2023-01-27 NOTE — PLAN OF CARE
Data: Patient presented to [facility]: 2023  2:41 PM.  Reason for maternal/fetal assessment is flank pain. Patient reports she has a history of kidney stones and believes she is having a flare up. She is having lower back/flank pain that started around 0300 on 23. Patient states she has feels nauseous from the pain.   Patient is a .  Prenatal record reviewed. Pregnancy  has been complicated by renal stones.  Gestational Age 30w2d. VSS. Fetal movement active. Patient denies uterine contractions, leaking of vaginal fluid/rupture of membranes, vaginal bleeding, abdominal pain, pelvic pressure, vomiting, headache, visual disturbances, epigastric or URQ pain, significant edema. Support person is not present.   Action: Verbal consent for EFM. Triage assessment completed. Bill of rights reviewed.  Response: Patient verbalized agreement with plan. Will contact Dr Kelly Garcia with update and for further orders.

## 2023-01-27 NOTE — PLAN OF CARE
Data: Patient assessed in the [facility] for kidney stone eval.  Cervical exam not examined.  Membranes intact.  Contractions/uterine assessment: no contractions.  Action:  Presumed adequate fetal oxygenation documented (see flow record). Discharge instructions reviewed.  Patient instructed to report change in fetal movement, vaginal leaking of fluid or bleeding, abdominal pain, or any concerns related to the pregnancy to her nurse/physician.    Response: Orders to discharge home per Dr Garcia.  Patient verbalized understanding of education and verbalized agreement with plan. Discharged to home at 1727.

## 2023-01-27 NOTE — DISCHARGE INSTRUCTIONS
Discharge Instruction for Undelivered Patients      You were seen for:  Kidney Stones  We Consulted: Dr FATIMAH Moncada  You had (Test or Medicine):renal US, labs, fetal and uterine monitoring     Diet:   Drink 8 to 12 glasses of liquids (milk, juice, water) every day.  You may eat meals and snacks.     Activity:  Count fetal kicks everyday (see handout)  Call your doctor or nurse midwife if your baby is moving less than usual.     Call your provider if you notice:  Swelling in your face or increased swelling in your hands or legs.  Headaches that are not relieved by Tylenol (acetaminophen).  Changes in your vision (blurring: seeing spots or stars.)  Nausea (sick to your stomach) and vomiting (throwing up).   Weight gain of 5 pounds or more per week.  Heartburn that doesn't go away.  Signs of bladder infection: pain when you urinate (use the toilet), need to go more often and more urgently.  The bag of mcdonough (rupture of membranes) breaks, or you notice leaking in your underwear.  Bright red blood in your underwear.  Abdominal (lower belly) or stomach pain.  Second (plus) baby: Contractions (tightening) less than 10 minutes apart and getting stronger.  *If less than 34 weeks: Contractions (tightening) more than 6 times in one hour.  Increase or change in vaginal discharge (note the color and amount)  Other: Call clinic with any questions or concerns.    Follow-up:  As scheduled in the clinic

## 2023-01-27 NOTE — PROVIDER NOTIFICATION
01/27/23 1524   Provider Notification   Provider Name/Title Dr. Paz   Method of Notification Phone   Request Evaluate - Remote   Notified MD of pt's arrival and chief complaint of flank pain (see triage admit note). MD put in orders for renal ultrasound, CBC with platelets and differential, CMP, UA with reflex culture. MD ordered for pt to receive tylenol and atarax to help with pain. Will call MD with results.

## 2023-01-27 NOTE — PROGRESS NOTES
"[facility]  OB Triage Note    CC: Back pain    HPI: Ms. Hazel Ponce is a 27 year old  at 30w2d, who presents with back pain.  The patient states she has a history of kidney stones bilaterally, for which she has been seen by urology within the [facility] system.  Most recent visit on 2023 following follow-up ultrasound that showed stability of moderate right hydronephrosis and likely stones.  The patient reports always well, however this morning she woke up and had increase in discomfort to approximately 3-2010 out of pain.  She did take 1 dose of Tylenol that helped moderately, as well as a dose of Atarax.  The patient states that despite these medications, she still had aching discomfort in her left greater than right back and was concerned that something change with a stone in her kidney function.  She denies contractions, leaking fluid, vaginal bleeding.  Good fetal movement.    The patient states since she got Tylenol and Atarax here, the pain is much more bearable.  She denies any dysuria, hematuria.  No changes with bowel movements.  No systemic fevers.    O:  Patient Vitals for the past 24 hrs:   BP Temp Temp src Resp Height Weight   23 1500 132/72 98.1  F (36.7  C) Oral 18 1.575 m (5' 2\") 82.1 kg (181 lb)     Gen: Well-appearing, NAD  CV: RRR  Pulm: Non-labored breathing, no cough or wheezing appreciated   Abd: Soft, gravid  Ext: No LE edema b/l    FHT: Baseline 145, moderate variability, 15x15 accels, absent decels  [place]: Rare irritability     Labs:  Component      Latest Ref Rng & Units 2023   WBC      4.0 - 11.0 10e3/uL 14.5 (H)   RBC Count      3.80 - 5.20 10e6/uL 4.43   Hemoglobin      11.7 - 15.7 g/dL 12.2   Hematocrit      35.0 - 47.0 % 37.4   MCV      78 - 100 fL 84   MCH      26.5 - 33.0 pg 27.5   MCHC      31.5 - 36.5 g/dL 32.6   RDW      10.0 - 15.0 % 13.9   Platelet Count      150 - 450 10e3/uL 307   % Neutrophils      % 83   % Lymphocytes      % 13 "   % Monocytes      % 3   % Eosinophils      % 1   % Basophils      % 0   % Immature Granulocytes      % 0   NRBCs per 100 WBC      <1 /100 0   Absolute Neutrophils      1.6 - 8.3 10e3/uL 12.0 (H)   Absolute Lymphocytes      0.8 - 5.3 10e3/uL 1.9   Absolute Monocytes      0.0 - 1.3 10e3/uL 0.5   Absolute Eosinophils      0.0 - 0.7 10e3/uL 0.1   Absolute Basophils      0.0 - 0.2 10e3/uL 0.0   Absolute Immature Granulocytes      <=0.4 10e3/uL 0.1   Absolute NRBCs      10e3/uL 0.0   Color Urine      Colorless, Straw, Light Yellow, Yellow Light Yellow   Appearance Urine      Clear Slightly Cloudy (A)   Glucose Urine      Negative mg/dL Negative   Bilirubin Urine      Negative Negative   Ketones Urine      Negative mg/dL Negative   Specific Gravity Urine      1.003 - 1.035 1.021   Blood Urine      Negative Negative   pH Urine      5.0 - 7.0 8.0 (H)   Protein Albumin Urine      Negative mg/dL 10 (A)   Urobilinogen mg/dL      Normal, 2.0 mg/dL Normal   Nitrite Urine      Negative Negative   Leukocyte Esterase Urine      Negative Negative   Mucus Urine      None Seen /LPF Present (A)   RBC Urine      <=2 /HPF 1   WBC Urine      <=5 /HPF 1   Squamous Epithelial /HPF Urine      <=1 /HPF 8 (H)   Sodium      136 - 145 mmol/L 135 (L)   Potassium      3.4 - 5.3 mmol/L 3.9   Chloride      98 - 107 mmol/L 103   Carbon Dioxide (CO2)      22 - 29 mmol/L 21 (L)   Anion Gap      7 - 15 mmol/L 11   Urea Nitrogen      6.0 - 20.0 mg/dL 11.0   Creatinine      0.51 - 0.95 mg/dL 0.73   Calcium      8.6 - 10.0 mg/dL 8.5 (L)   Glucose      70 - 99 mg/dL 87   Alkaline Phosphatase      35 - 104 U/L 103   AST      10 - 35 U/L 17   ALT      10 - 35 U/L 21   Protein Total      6.4 - 8.3 g/dL 6.9   Albumin      3.5 - 5.2 g/dL 3.4 (L)   Bilirubin Total      <=1.2 mg/dL 0.2   GFR Estimate      >60 mL/min/1.73m2 >90     Renal US from 1/27/2023:   EXAM: US RENAL COMPLETE NON-VASCULAR  LOCATION: [facility]  DATE/TIME: 1/27/2023 4:56  PM     INDICATION: Right flank pain. The patient is 30 weeks 2 days pregnant.  COMPARISON: None.  TECHNIQUE: Routine Bilateral Renal and Bladder Ultrasound.     FINDINGS:     RIGHT KIDNEY: 11.4 x 6.7 x 5.9 cm. Several echogenic foci in the right kidney are suspicious for nonobstructing stones, with the largest in the lower pole measuring 1.7 cm. There is mild to moderate right hydronephrosis.      LEFT KIDNEY: 11.5 x 6.1 x 7.2 cm. No renal stones or masses are identified. There is mild left hydronephrosis.      BLADDER: Normal.                                                                      IMPRESSION:  1.  Bilateral hydronephrosis, mild to moderate on the right, and mild on the left. Findings could be related to the gravid uterus, although obstructing ureteral stones could also produce this appearance.  2.  Several echogenic foci in the right kidney are suspicious for nonobstructing stones, with the largest measuring 1.7 cm.    A/P:  Ms. Hazel Ponce is a 27 year old  at 30w2d here with back pain, with known nephrolithiasis with mild to moderate hydronephrosis on the right, mild on the left with no significant changes on evaluation for today, patient is afebrile.  -I did discuss with the patient that her increased pain could be secondary to the nephrolithiasis/calculi moving.  I did review though, that her creatinine is stable, she is afebrile, and her urine does not show any signs of infection.  I did appreciate that she had a slight bump in leukocytosis, but this does not fit her clinical picture.  I did talk to Dr. Khalil, partner of her primary urologist who reviewed the use findings with me.  He did recommend expectant management with Tylenol, Atarax, heat modalities and other, as no surgical intervention is warranted at this time.  This was relayed with the patient, she states understanding.  She will return with any fevers, chills, increasing pain, nausea, vomiting, or  hematuria/dysuria.  -Safe doses of Tylenol as well as Atarax was reviewed, refill of Atarax was sent to her pharmacy.    FWB:   - Category I FHT, reactive    Follow up:   - Appointment, routine      Kelly Barfield MD   Pager: [phone]   January 27, 2023, 5:34 PM

## 2023-03-08 LAB — GROUP B STREPTOCOCCUS (EXTERNAL): NEGATIVE

## 2023-03-21 ENCOUNTER — HOSPITAL ENCOUNTER (OUTPATIENT)
Facility: CLINIC | Age: 28
Discharge: HOME | End: 2023-03-21
Attending: OBSTETRICS & GYNECOLOGY | Admitting: OBSTETRICS & GYNECOLOGY
Payer: COMMERCIAL

## 2023-03-21 VITALS
TEMPERATURE: 98.8 F | SYSTOLIC BLOOD PRESSURE: 132 MMHG | HEIGHT: 63 IN | DIASTOLIC BLOOD PRESSURE: 83 MMHG | WEIGHT: 182 LBS | RESPIRATION RATE: 18 BRPM | BODY MASS INDEX: 32.25 KG/M2

## 2023-03-21 PROCEDURE — G0463 HOSPITAL OUTPT CLINIC VISIT: HCPCS

## 2023-03-21 RX ORDER — LIDOCAINE 40 MG/G
CREAM TOPICAL
Status: DISCONTINUED | OUTPATIENT
Start: 2023-03-21 | End: 2023-03-21 | Stop reason: HOSPADM

## 2023-03-21 ASSESSMENT — ACTIVITIES OF DAILY LIVING (ADL): ADLS_ACUITY_SCORE: 35

## 2023-03-22 NOTE — PLAN OF CARE
Data: Patient assessed in the [facility] for uterine contractions.  Cervical exam anterior, dilated to 2, effaced >70% and soft.  Membranes intact.  Contractions/uterine assessment performed.  Contractions decreased.  Dr Rangel reviewed tracing.  Action:  Presumed adequate fetal oxygenation documented (see flow record). Discharge instructions reviewed.  Patient instructed to report change in fetal movement, vaginal leaking of fluid or bleeding, abdominal pain, or any concerns related to the pregnancy to her nurse/physician.    Response: Orders to discharge home per Jory Rangel.  Patient verbalized understanding of education and verbalized agreement with plan. Discharged to home at 2135. Pt scheduled to be seen in clinic tomorrow.  Savanah Hong RN on 3/21/2023 at 9:33 PM

## 2023-03-22 NOTE — PLAN OF CARE
Data: Patient presented to [facility]: 3/21/2023  7:10 PM.  Reason for maternal/fetal assessment is uterine contractions. Patient reports contractions q 2 minutes for 1 hour prior to arrival.  Patient is a .  Prenatal record reviewed. Pregnancy has been uncomplicated..  Gestational Age 37w6d. VSS. Fetal movement active. Patient denies leaking of vaginal fluid/rupture of membranes, vaginal bleeding, abdominal pain, nausea, vomiting, headache, visual disturbances, epigastric or URQ pain, significant edema. Support person is not present.   Action: Verbal consent for EFM. Triage assessment completed. Bill of rights reviewed.  Response: Patient verbalized agreement with plan. Will contact Dr Jory Rangel with update and for further orders.   Savanah Hong RN on 3/21/2023 at 7:39 PM

## 2023-03-22 NOTE — PLAN OF CARE
Fetal strip not archived from [id number] due to system down time.   Paper strip reviewed.  No decelerations noted during this period, baseline maintained.  Savanah Hong RN on 3/21/2023 at 9:10 PM

## 2023-03-22 NOTE — PROVIDER NOTIFICATION
03/21/23 2050   Provider Notification   Provider Name/Title dr mg   Method of Notification In Department   Notification Reason Decels;Labor Status;Status Update  (reviewed tracing including decels.   Continue monitoring until 2130 and then will reassess.)     Savanah Hong RN on 3/21/2023 at 8:54 PM

## 2023-03-22 NOTE — DISCHARGE INSTRUCTIONS
Discharge Instruction for Undelivered Patients      You were seen for: Labor Assessment  We Consulted: Dr Rangel  You had (Test or Medicine):fetal monitoring, cervical exams    Diet:   Drink 8 to 12 glasses of liquids (milk, juice, water) every day.  You may eat meals and snacks.     Activity:  Count fetal kicks everyday (see handout)  Call your doctor or nurse midwife if your baby is moving less than usual.     Call your provider if you notice:  Swelling in your face or increased swelling in your hands or legs.  Headaches that are not relieved by Tylenol (acetaminophen).  Changes in your vision (blurring: seeing spots or stars.)  Nausea (sick to your stomach) and vomiting (throwing up).   Weight gain of 5 pounds or more per week.  Heartburn that doesn't go away.  Signs of bladder infection: pain when you urinate (use the toilet), need to go more often and more urgently.  The bag of mcdonough (rupture of membranes) breaks, or you notice leaking in your underwear.  Bright red blood in your underwear.  Abdominal (lower belly) or stomach pain.  For first baby: Contractions (tightening) less than 5 minutes apart for one hour or more.  Second (plus) baby: Contractions (tightening) less than 10 minutes apart and getting stronger.  *If less than 34 weeks: Contractions (tightening) more than 6 times in one hour.  Increase or change in vaginal discharge (note the color and amount)      Follow-up:  As scheduled in the clinic

## 2023-03-22 NOTE — PROVIDER NOTIFICATION
03/21/23 1930   Provider Notification   Provider Name/Title Dr Rangel   Method of Notification Phone   Request Evaluate - Remote   Notification Reason Patient Arrived;Labor Status;Uterine Activity;Status Update;SVE  (updated with presenting symptoms, FHT's Cat 1, triage orders received.  Observe for an hour and re-check cervix.)     Savanah Hong RN on 3/21/2023 at 7:38 PM

## 2023-03-24 ENCOUNTER — HOSPITAL ENCOUNTER (OUTPATIENT)
Facility: CLINIC | Age: 28
Discharge: HOME | End: 2023-03-24
Attending: OBSTETRICS & GYNECOLOGY | Admitting: OBSTETRICS & GYNECOLOGY
Payer: COMMERCIAL

## 2023-03-24 ENCOUNTER — HOSPITAL ENCOUNTER (INPATIENT)
Facility: CLINIC | Age: 28
LOS: 3 days | Discharge: HOME | End: 2023-03-27
Attending: OBSTETRICS & GYNECOLOGY | Admitting: OBSTETRICS & GYNECOLOGY
Payer: COMMERCIAL

## 2023-03-24 VITALS — DIASTOLIC BLOOD PRESSURE: 82 MMHG | RESPIRATION RATE: 18 BRPM | SYSTOLIC BLOOD PRESSURE: 138 MMHG | TEMPERATURE: 97.9 F

## 2023-03-24 DIAGNOSIS — O14.13 SEVERE PRE-ECLAMPSIA IN THIRD TRIMESTER: ICD-10-CM

## 2023-03-24 LAB
ABO/RH(D): NORMAL
ANTIBODY SCREEN: NEGATIVE
CRYSTALS AMN MICRO: NORMAL
SPECIMEN EXPIRATION DATE: NORMAL

## 2023-03-24 PROCEDURE — 86780 TREPONEMA PALLIDUM: CPT | Performed by: OBSTETRICS & GYNECOLOGY

## 2023-03-24 PROCEDURE — 258N000003 HC RX IP 258 OP 636: Performed by: OBSTETRICS & GYNECOLOGY

## 2023-03-24 PROCEDURE — 722N000001 HC LABOR CARE VAGINAL DELIVERY SINGLE

## 2023-03-24 PROCEDURE — 250N000011 HC RX IP 250 OP 636: Performed by: OBSTETRICS & GYNECOLOGY

## 2023-03-24 PROCEDURE — 80053 COMPREHEN METABOLIC PANEL: CPT | Performed by: OBSTETRICS & GYNECOLOGY

## 2023-03-24 PROCEDURE — 86850 RBC ANTIBODY SCREEN: CPT | Performed by: OBSTETRICS & GYNECOLOGY

## 2023-03-24 PROCEDURE — 250N000009 HC RX 250: Performed by: OBSTETRICS & GYNECOLOGY

## 2023-03-24 PROCEDURE — 250N000013 HC RX MED GY IP 250 OP 250 PS 637: Performed by: OBSTETRICS & GYNECOLOGY

## 2023-03-24 PROCEDURE — 120N000001 HC R&B MED SURG/OB

## 2023-03-24 PROCEDURE — 0KQM0ZZ REPAIR PERINEUM MUSCLE, OPEN APPROACH: ICD-10-PCS | Performed by: OBSTETRICS & GYNECOLOGY

## 2023-03-24 PROCEDURE — 86708 HEPATITIS A ANTIBODY: CPT | Performed by: OBSTETRICS & GYNECOLOGY

## 2023-03-24 PROCEDURE — 250N000011 HC RX IP 250 OP 636

## 2023-03-24 PROCEDURE — 86901 BLOOD TYPING SEROLOGIC RH(D): CPT | Performed by: OBSTETRICS & GYNECOLOGY

## 2023-03-24 PROCEDURE — G0463 HOSPITAL OUTPT CLINIC VISIT: HCPCS

## 2023-03-24 RX ORDER — OXYTOCIN 10 [USP'U]/ML
10 INJECTION, SOLUTION INTRAMUSCULAR; INTRAVENOUS
Status: DISCONTINUED | OUTPATIENT
Start: 2023-03-24 | End: 2023-03-25 | Stop reason: HOSPADM

## 2023-03-24 RX ORDER — PROCHLORPERAZINE MALEATE 10 MG
10 TABLET ORAL EVERY 6 HOURS PRN
Status: DISCONTINUED | OUTPATIENT
Start: 2023-03-24 | End: 2023-03-25 | Stop reason: HOSPADM

## 2023-03-24 RX ORDER — LIDOCAINE 40 MG/G
CREAM TOPICAL
Status: DISCONTINUED | OUTPATIENT
Start: 2023-03-24 | End: 2023-03-24 | Stop reason: HOSPADM

## 2023-03-24 RX ORDER — FENTANYL CITRATE 50 UG/ML
INJECTION, SOLUTION INTRAMUSCULAR; INTRAVENOUS
Status: COMPLETED
Start: 2023-03-24 | End: 2023-03-24

## 2023-03-24 RX ORDER — METOCLOPRAMIDE HYDROCHLORIDE 5 MG/ML
10 INJECTION INTRAMUSCULAR; INTRAVENOUS EVERY 6 HOURS PRN
Status: DISCONTINUED | OUTPATIENT
Start: 2023-03-24 | End: 2023-03-25 | Stop reason: HOSPADM

## 2023-03-24 RX ORDER — LABETALOL HYDROCHLORIDE 5 MG/ML
INJECTION, SOLUTION INTRAVENOUS
Status: DISCONTINUED
Start: 2023-03-24 | End: 2023-03-25 | Stop reason: WASHOUT

## 2023-03-24 RX ORDER — NALOXONE HYDROCHLORIDE 0.4 MG/ML
0.2 INJECTION, SOLUTION INTRAMUSCULAR; INTRAVENOUS; SUBCUTANEOUS
Status: DISCONTINUED | OUTPATIENT
Start: 2023-03-24 | End: 2023-03-25 | Stop reason: HOSPADM

## 2023-03-24 RX ORDER — OXYTOCIN/0.9 % SODIUM CHLORIDE 30/500 ML
340 PLASTIC BAG, INJECTION (ML) INTRAVENOUS CONTINUOUS PRN
Status: DISCONTINUED | OUTPATIENT
Start: 2023-03-24 | End: 2023-03-25 | Stop reason: HOSPADM

## 2023-03-24 RX ORDER — NALOXONE HYDROCHLORIDE 0.4 MG/ML
0.4 INJECTION, SOLUTION INTRAMUSCULAR; INTRAVENOUS; SUBCUTANEOUS
Status: DISCONTINUED | OUTPATIENT
Start: 2023-03-24 | End: 2023-03-25 | Stop reason: HOSPADM

## 2023-03-24 RX ORDER — ONDANSETRON 2 MG/ML
4 INJECTION INTRAMUSCULAR; INTRAVENOUS EVERY 6 HOURS PRN
Status: DISCONTINUED | OUTPATIENT
Start: 2023-03-24 | End: 2023-03-25 | Stop reason: HOSPADM

## 2023-03-24 RX ORDER — OXYTOCIN/0.9 % SODIUM CHLORIDE 30/500 ML
100-340 PLASTIC BAG, INJECTION (ML) INTRAVENOUS CONTINUOUS PRN
Status: DISCONTINUED | OUTPATIENT
Start: 2023-03-24 | End: 2023-03-26

## 2023-03-24 RX ORDER — CARBOPROST TROMETHAMINE 250 UG/ML
250 INJECTION, SOLUTION INTRAMUSCULAR
Status: DISCONTINUED | OUTPATIENT
Start: 2023-03-24 | End: 2023-03-25 | Stop reason: HOSPADM

## 2023-03-24 RX ORDER — CITRIC ACID/SODIUM CITRATE 334-500MG
30 SOLUTION, ORAL ORAL
Status: DISCONTINUED | OUTPATIENT
Start: 2023-03-24 | End: 2023-03-25 | Stop reason: HOSPADM

## 2023-03-24 RX ORDER — TRANEXAMIC ACID 10 MG/ML
1 INJECTION, SOLUTION INTRAVENOUS EVERY 30 MIN PRN
Status: DISCONTINUED | OUTPATIENT
Start: 2023-03-24 | End: 2023-03-25 | Stop reason: HOSPADM

## 2023-03-24 RX ORDER — METOCLOPRAMIDE 10 MG/1
10 TABLET ORAL EVERY 6 HOURS PRN
Status: DISCONTINUED | OUTPATIENT
Start: 2023-03-24 | End: 2023-03-25 | Stop reason: HOSPADM

## 2023-03-24 RX ORDER — FENTANYL CITRATE 50 UG/ML
100 INJECTION, SOLUTION INTRAMUSCULAR; INTRAVENOUS
Status: DISCONTINUED | OUTPATIENT
Start: 2023-03-24 | End: 2023-03-25 | Stop reason: HOSPADM

## 2023-03-24 RX ORDER — ONDANSETRON 4 MG/1
4 TABLET, ORALLY DISINTEGRATING ORAL EVERY 6 HOURS PRN
Status: DISCONTINUED | OUTPATIENT
Start: 2023-03-24 | End: 2023-03-25 | Stop reason: HOSPADM

## 2023-03-24 RX ORDER — SODIUM CHLORIDE, SODIUM LACTATE, POTASSIUM CHLORIDE, CALCIUM CHLORIDE 600; 310; 30; 20 MG/100ML; MG/100ML; MG/100ML; MG/100ML
INJECTION, SOLUTION INTRAVENOUS CONTINUOUS
Status: DISCONTINUED | OUTPATIENT
Start: 2023-03-24 | End: 2023-03-25 | Stop reason: HOSPADM

## 2023-03-24 RX ORDER — IBUPROFEN 800 MG/1
800 TABLET, FILM COATED ORAL
Status: DISCONTINUED | OUTPATIENT
Start: 2023-03-24 | End: 2023-03-25

## 2023-03-24 RX ORDER — OXYTOCIN 10 [USP'U]/ML
10 INJECTION, SOLUTION INTRAMUSCULAR; INTRAVENOUS
Status: DISCONTINUED | OUTPATIENT
Start: 2023-03-24 | End: 2023-03-26

## 2023-03-24 RX ORDER — KETOROLAC TROMETHAMINE 30 MG/ML
30 INJECTION, SOLUTION INTRAMUSCULAR; INTRAVENOUS
Status: DISCONTINUED | OUTPATIENT
Start: 2023-03-24 | End: 2023-03-25

## 2023-03-24 RX ORDER — MISOPROSTOL 200 UG/1
800 TABLET ORAL
Status: DISCONTINUED | OUTPATIENT
Start: 2023-03-24 | End: 2023-03-25 | Stop reason: HOSPADM

## 2023-03-24 RX ORDER — LIDOCAINE 40 MG/G
CREAM TOPICAL
Status: DISCONTINUED | OUTPATIENT
Start: 2023-03-24 | End: 2023-03-25 | Stop reason: HOSPADM

## 2023-03-24 RX ORDER — METHYLERGONOVINE MALEATE 0.2 MG/ML
200 INJECTION INTRAVENOUS
Status: DISCONTINUED | OUTPATIENT
Start: 2023-03-24 | End: 2023-03-25 | Stop reason: HOSPADM

## 2023-03-24 RX ORDER — MISOPROSTOL 200 UG/1
400 TABLET ORAL
Status: DISCONTINUED | OUTPATIENT
Start: 2023-03-24 | End: 2023-03-25 | Stop reason: HOSPADM

## 2023-03-24 RX ORDER — PROCHLORPERAZINE 25 MG
25 SUPPOSITORY, RECTAL RECTAL EVERY 12 HOURS PRN
Status: DISCONTINUED | OUTPATIENT
Start: 2023-03-24 | End: 2023-03-25 | Stop reason: HOSPADM

## 2023-03-24 RX ORDER — ACETAMINOPHEN 325 MG/1
650 TABLET ORAL EVERY 4 HOURS PRN
Status: DISCONTINUED | OUTPATIENT
Start: 2023-03-24 | End: 2023-03-25 | Stop reason: HOSPADM

## 2023-03-24 RX ORDER — FENTANYL/BUPIVACAINE/NS/PF 2-1250MCG
PLASTIC BAG, INJECTION (ML) INJECTION
Status: DISCONTINUED
Start: 2023-03-24 | End: 2023-03-25 | Stop reason: WASHOUT

## 2023-03-24 RX ADMIN — Medication 340 ML/HR: at 23:23

## 2023-03-24 RX ADMIN — CARBOPROST TROMETHAMINE 250 MCG: 250 INJECTION, SOLUTION INTRAMUSCULAR at 23:28

## 2023-03-24 RX ADMIN — FENTANYL CITRATE 100 MCG: 50 INJECTION, SOLUTION INTRAMUSCULAR; INTRAVENOUS at 22:40

## 2023-03-24 RX ADMIN — FENTANYL CITRATE 100 MCG: 50 INJECTION, SOLUTION INTRAMUSCULAR; INTRAVENOUS at 23:40

## 2023-03-24 RX ADMIN — LIDOCAINE HYDROCHLORIDE 20 ML: 10 INJECTION, SOLUTION EPIDURAL; INFILTRATION; INTRACAUDAL; PERINEURAL at 23:35

## 2023-03-24 RX ADMIN — SODIUM CHLORIDE, POTASSIUM CHLORIDE, SODIUM LACTATE AND CALCIUM CHLORIDE: 600; 310; 30; 20 INJECTION, SOLUTION INTRAVENOUS at 23:10

## 2023-03-24 RX ADMIN — MISOPROSTOL 800 MCG: 200 TABLET ORAL at 23:53

## 2023-03-24 ASSESSMENT — ACTIVITIES OF DAILY LIVING (ADL)
ADLS_ACUITY_SCORE: 31
ADLS_ACUITY_SCORE: 35

## 2023-03-24 NOTE — PLAN OF CARE
Data: Patient presented to [facility]: 3/24/2023  8:50 AM.  Reason for maternal/fetal assessment is  uterine contractions, decreased fetal movement, vaginal bleeding. Patient reports having CTXs Q. 10 mins, < fetal movement, and scant bleeding (bright red) that started late last night while sleeping.  Patient is a .  Prenatal record reviewed. Pregnancy  has been complicated by obesity.  Gestational Age 38w2d. VSS. Fetal movement decreased since last night 3/23/23, however, did feel baby move after being put on the monitors @901. Patient denies pelvic pressure, nausea, vomiting, headache, visual disturbances, epigastric or URQ pain, significant edema. Support person is not present.   Action: Verbal consent for EFM. Triage assessment completed. Bill of rights reviewed.  Response: Patient verbalized agreement with plan. Will contact Dr Heena Drake with update and for further orders.

## 2023-03-24 NOTE — PROVIDER NOTIFICATION
03/24/23 0938   Provider Notification   Provider Name/Title Dr. Drake   Method of Notification Electronic Page   Request Evaluate - Remote   Notification Reason Patient Arrived   Heena Drake informed of patient arrival and assessment including the following:   Scant vaginal bleeding (bright red), SVE (2.5, 75, -3), FHR tracing Cat I, baseline 135, accels present, contraction pattern irreg Q.5-10 mins, palpating moderate. Patient denies pain at this time. Orders received to monitor patient for 2 hours. MD giordano with intermittent fetal monitoring (off monitor for 20 mins/ on for 10 mins - every hour).

## 2023-03-24 NOTE — DISCHARGE INSTRUCTIONS
Discharge Instruction for Undelivered Patients      You were seen for: Labor Assessment, Bleeding Assessment, and Fetal Assessment  We Consulted: Dr. Drake  You had (Test or Medicine): 2 hour FHR and CTX monitoring    Diet:   Drink 8 to 12 glasses of liquids (milk, juice, water) every day.  You may eat meals and snacks.     Activity:  Call your doctor or nurse midwife if your baby is moving less than usual.     Call your provider if you notice:  Swelling in your face or increased swelling in your hands or legs.  Headaches that are not relieved by Tylenol (acetaminophen).  Changes in your vision (blurring: seeing spots or stars.)  Nausea (sick to your stomach) and vomiting (throwing up).   Weight gain of 5 pounds or more per week.  Heartburn that doesn't go away.  Signs of bladder infection: pain when you urinate (use the toilet), need to go more often and more urgently.  The bag of mcdonough (rupture of membranes) breaks, or you notice leaking in your underwear.  Bright red blood in your underwear.  Abdominal (lower belly) or stomach pain.  Contractions (tightening) less than 10 minutes apart and getting stronger.  Increase or change in vaginal discharge (note the color and amount)    Follow-up:  As scheduled in the clinic on Wednesday 3/ 29/23

## 2023-03-24 NOTE — PLAN OF CARE
Data: Patient assessed in [facility] for uterine contractions, decreased fetal movement, vaginal bleeding. SVE (2.5/75/-3) very posterior. Membranes intact per patient report. Contractions/uterine assessment irregular CTXs Q. 3-10 mins. Patient denies pain.   Action: Presumed adequate fetal oxygenation documented (see flow record). Discharge instructions reviewed. Patient instructed to report change in fetal movement, vaginal leaking of fluid or bleeding, abdominal pain, or any concerns related to the pregnancy to her nurse/physician.    Response: Orders to discharge home per Dr. Drake. Patient verbalized understanding of education and verbalized agreement with plan. Discharged to home at 1135. Patient is scheduled to be in clinic on Wed. 3/29/23 w/ Dr. Mallory.

## 2023-03-24 NOTE — PROVIDER NOTIFICATION
03/24/23 1056   Provider Notification   Provider Name/Title Dr. Drake   Method of Notification In Department   Notification Reason Status Update   MD notified of patient's status. No additional bleeding or leaking noted. FHR tracing Cat I, baseline 135, accels present, contraction pattern irreg Q. 3-10. Orders received to discharge patient. Patient to follow-up in clinic next week on Wed. 3/29/23.

## 2023-03-25 LAB
ABO/RH(D): NORMAL
ALBUMIN SERPL BCG-MCNC: 3.1 G/DL (ref 3.5–5.2)
ALBUMIN SERPL BCG-MCNC: 3.5 G/DL (ref 3.5–5.2)
ALP SERPL-CCNC: 176 U/L (ref 35–104)
ALP SERPL-CCNC: 198 U/L (ref 35–104)
ALT SERPL W P-5'-P-CCNC: 152 U/L (ref 10–35)
ALT SERPL W P-5'-P-CCNC: 177 U/L (ref 10–35)
ANION GAP SERPL CALCULATED.3IONS-SCNC: 12 MMOL/L (ref 7–15)
ANION GAP SERPL CALCULATED.3IONS-SCNC: 15 MMOL/L (ref 7–15)
AST SERPL W P-5'-P-CCNC: 72 U/L (ref 10–35)
AST SERPL W P-5'-P-CCNC: 88 U/L (ref 10–35)
BILIRUB SERPL-MCNC: 0.8 MG/DL
BILIRUB SERPL-MCNC: 0.9 MG/DL
BUN SERPL-MCNC: 10.7 MG/DL (ref 6–20)
BUN SERPL-MCNC: 6.5 MG/DL (ref 6–20)
CALCIUM SERPL-MCNC: 7.7 MG/DL (ref 8.6–10)
CALCIUM SERPL-MCNC: 8.6 MG/DL (ref 8.6–10)
CHLORIDE SERPL-SCNC: 100 MMOL/L (ref 98–107)
CHLORIDE SERPL-SCNC: 101 MMOL/L (ref 98–107)
CREAT SERPL-MCNC: 0.44 MG/DL (ref 0.51–0.95)
CREAT SERPL-MCNC: 0.49 MG/DL (ref 0.51–0.95)
DEPRECATED HCO3 PLAS-SCNC: 21 MMOL/L (ref 22–29)
DEPRECATED HCO3 PLAS-SCNC: 22 MMOL/L (ref 22–29)
ERYTHROCYTE [DISTWIDTH] IN BLOOD BY AUTOMATED COUNT: 16.3 % (ref 10–15)
ERYTHROCYTE [DISTWIDTH] IN BLOOD BY AUTOMATED COUNT: 16.6 % (ref 10–15)
FETAL BLEED SCREEN: NORMAL
GFR SERPL CREATININE-BSD FRML MDRD: >90 ML/MIN/1.73M2
GFR SERPL CREATININE-BSD FRML MDRD: >90 ML/MIN/1.73M2
GLUCOSE SERPL-MCNC: 76 MG/DL (ref 70–99)
GLUCOSE SERPL-MCNC: 98 MG/DL (ref 70–99)
HCT VFR BLD AUTO: 32.5 % (ref 35–47)
HCT VFR BLD AUTO: 33.8 % (ref 35–47)
HGB BLD-MCNC: 10.7 G/DL (ref 11.7–15.7)
HGB BLD-MCNC: 11.1 G/DL (ref 11.7–15.7)
HOLD SPECIMEN: NORMAL
MCH RBC QN AUTO: 27.3 PG (ref 26.5–33)
MCH RBC QN AUTO: 27.5 PG (ref 26.5–33)
MCHC RBC AUTO-ENTMCNC: 32.8 G/DL (ref 31.5–36.5)
MCHC RBC AUTO-ENTMCNC: 32.9 G/DL (ref 31.5–36.5)
MCV RBC AUTO: 83 FL (ref 78–100)
MCV RBC AUTO: 84 FL (ref 78–100)
PLATELET # BLD AUTO: 318 10E3/UL (ref 150–450)
PLATELET # BLD AUTO: 331 10E3/UL (ref 150–450)
POTASSIUM SERPL-SCNC: 3.8 MMOL/L (ref 3.4–5.3)
POTASSIUM SERPL-SCNC: 3.8 MMOL/L (ref 3.4–5.3)
PROT SERPL-MCNC: 6.6 G/DL (ref 6.4–8.3)
PROT SERPL-MCNC: 7.4 G/DL (ref 6.4–8.3)
RBC # BLD AUTO: 3.92 10E6/UL (ref 3.8–5.2)
RBC # BLD AUTO: 4.04 10E6/UL (ref 3.8–5.2)
SODIUM SERPL-SCNC: 134 MMOL/L (ref 136–145)
SODIUM SERPL-SCNC: 137 MMOL/L (ref 136–145)
SPECIMEN EXPIRATION DATE: NORMAL
T PALLIDUM AB SER QL: NONREACTIVE
WBC # BLD AUTO: 15.6 10E3/UL (ref 4–11)
WBC # BLD AUTO: 18.2 10E3/UL (ref 4–11)

## 2023-03-25 PROCEDURE — 258N000003 HC RX IP 258 OP 636: Performed by: OBSTETRICS & GYNECOLOGY

## 2023-03-25 PROCEDURE — 250N000011 HC RX IP 250 OP 636: Performed by: OBSTETRICS & GYNECOLOGY

## 2023-03-25 PROCEDURE — 85461 HEMOGLOBIN FETAL: CPT | Performed by: OBSTETRICS & GYNECOLOGY

## 2023-03-25 PROCEDURE — 722N000001 HC LABOR CARE VAGINAL DELIVERY SINGLE

## 2023-03-25 PROCEDURE — 250N000013 HC RX MED GY IP 250 OP 250 PS 637: Performed by: OBSTETRICS & GYNECOLOGY

## 2023-03-25 PROCEDURE — 80053 COMPREHEN METABOLIC PANEL: CPT | Performed by: OBSTETRICS & GYNECOLOGY

## 2023-03-25 PROCEDURE — 85027 COMPLETE CBC AUTOMATED: CPT | Performed by: OBSTETRICS & GYNECOLOGY

## 2023-03-25 PROCEDURE — 250N000011 HC RX IP 250 OP 636

## 2023-03-25 PROCEDURE — 36415 COLL VENOUS BLD VENIPUNCTURE: CPT | Performed by: OBSTETRICS & GYNECOLOGY

## 2023-03-25 PROCEDURE — 120N000001 HC R&B MED SURG/OB

## 2023-03-25 RX ORDER — TRANEXAMIC ACID 10 MG/ML
1 INJECTION, SOLUTION INTRAVENOUS EVERY 30 MIN PRN
Status: DISCONTINUED | OUTPATIENT
Start: 2023-03-25 | End: 2023-03-27 | Stop reason: HOSPADM

## 2023-03-25 RX ORDER — HYDROCORTISONE 25 MG/G
CREAM TOPICAL 3 TIMES DAILY PRN
Qty: 30 G | Refills: 0 | Status: SHIPPED | OUTPATIENT
Start: 2023-03-25

## 2023-03-25 RX ORDER — MAGNESIUM SULFATE IN WATER 40 MG/ML
2 INJECTION, SOLUTION INTRAVENOUS CONTINUOUS
Status: DISCONTINUED | OUTPATIENT
Start: 2023-03-25 | End: 2023-03-27 | Stop reason: HOSPADM

## 2023-03-25 RX ORDER — OXYTOCIN 10 [USP'U]/ML
10 INJECTION, SOLUTION INTRAMUSCULAR; INTRAVENOUS
Status: DISCONTINUED | OUTPATIENT
Start: 2023-03-25 | End: 2023-03-27 | Stop reason: HOSPADM

## 2023-03-25 RX ORDER — HYDROCORTISONE 25 MG/G
CREAM TOPICAL 3 TIMES DAILY PRN
Status: DISCONTINUED | OUTPATIENT
Start: 2023-03-25 | End: 2023-03-27 | Stop reason: HOSPADM

## 2023-03-25 RX ORDER — DOCUSATE SODIUM 100 MG/1
100 CAPSULE, LIQUID FILLED ORAL DAILY
Status: DISCONTINUED | OUTPATIENT
Start: 2023-03-25 | End: 2023-03-27 | Stop reason: HOSPADM

## 2023-03-25 RX ORDER — CALCIUM GLUCONATE 94 MG/ML
1 INJECTION, SOLUTION INTRAVENOUS
Status: DISCONTINUED | OUTPATIENT
Start: 2023-03-25 | End: 2023-03-27 | Stop reason: HOSPADM

## 2023-03-25 RX ORDER — LABETALOL HYDROCHLORIDE 5 MG/ML
20-80 INJECTION, SOLUTION INTRAVENOUS EVERY 10 MIN PRN
Status: DISCONTINUED | OUTPATIENT
Start: 2023-03-25 | End: 2023-03-27 | Stop reason: HOSPADM

## 2023-03-25 RX ORDER — MISOPROSTOL 200 UG/1
800 TABLET ORAL
Status: DISCONTINUED | OUTPATIENT
Start: 2023-03-25 | End: 2023-03-27 | Stop reason: HOSPADM

## 2023-03-25 RX ORDER — SODIUM CHLORIDE, SODIUM LACTATE, POTASSIUM CHLORIDE, CALCIUM CHLORIDE 600; 310; 30; 20 MG/100ML; MG/100ML; MG/100ML; MG/100ML
10-125 INJECTION, SOLUTION INTRAVENOUS CONTINUOUS
Status: DISCONTINUED | OUTPATIENT
Start: 2023-03-25 | End: 2023-03-27 | Stop reason: HOSPADM

## 2023-03-25 RX ORDER — DULOXETIN HYDROCHLORIDE 60 MG/1
60 CAPSULE, DELAYED RELEASE ORAL DAILY
Status: DISCONTINUED | OUTPATIENT
Start: 2023-03-25 | End: 2023-03-27 | Stop reason: HOSPADM

## 2023-03-25 RX ORDER — CARBOPROST TROMETHAMINE 250 UG/ML
250 INJECTION, SOLUTION INTRAMUSCULAR
Status: DISCONTINUED | OUTPATIENT
Start: 2023-03-25 | End: 2023-03-27 | Stop reason: HOSPADM

## 2023-03-25 RX ORDER — MAGNESIUM SULFATE HEPTAHYDRATE 40 MG/ML
4 INJECTION, SOLUTION INTRAVENOUS
Status: DISCONTINUED | OUTPATIENT
Start: 2023-03-25 | End: 2023-03-27 | Stop reason: HOSPADM

## 2023-03-25 RX ORDER — DOCUSATE SODIUM 100 MG/1
100 CAPSULE, LIQUID FILLED ORAL DAILY
Qty: 30 CAPSULE | Refills: 0 | Status: SHIPPED | OUTPATIENT
Start: 2023-03-25

## 2023-03-25 RX ORDER — MAGNESIUM SULFATE HEPTAHYDRATE 40 MG/ML
4 INJECTION, SOLUTION INTRAVENOUS ONCE
Status: COMPLETED | OUTPATIENT
Start: 2023-03-25 | End: 2023-03-25

## 2023-03-25 RX ORDER — MAGNESIUM SULFATE HEPTAHYDRATE 500 MG/ML
10 INJECTION, SOLUTION INTRAMUSCULAR; INTRAVENOUS
Status: DISCONTINUED | OUTPATIENT
Start: 2023-03-25 | End: 2023-03-27 | Stop reason: HOSPADM

## 2023-03-25 RX ORDER — MISOPROSTOL 200 UG/1
400 TABLET ORAL
Status: DISCONTINUED | OUTPATIENT
Start: 2023-03-25 | End: 2023-03-27 | Stop reason: HOSPADM

## 2023-03-25 RX ORDER — HYDRALAZINE HYDROCHLORIDE 20 MG/ML
10 INJECTION INTRAMUSCULAR; INTRAVENOUS
Status: DISCONTINUED | OUTPATIENT
Start: 2023-03-25 | End: 2023-03-27 | Stop reason: HOSPADM

## 2023-03-25 RX ORDER — MAGNESIUM SULFATE HEPTAHYDRATE 40 MG/ML
INJECTION, SOLUTION INTRAVENOUS
Status: COMPLETED
Start: 2023-03-25 | End: 2023-03-25

## 2023-03-25 RX ORDER — BISACODYL 10 MG
10 SUPPOSITORY, RECTAL RECTAL DAILY PRN
Status: DISCONTINUED | OUTPATIENT
Start: 2023-03-25 | End: 2023-03-27 | Stop reason: HOSPADM

## 2023-03-25 RX ORDER — LORAZEPAM 2 MG/ML
2 INJECTION INTRAMUSCULAR
Status: DISCONTINUED | OUTPATIENT
Start: 2023-03-25 | End: 2023-03-27 | Stop reason: HOSPADM

## 2023-03-25 RX ORDER — IBUPROFEN 800 MG/1
800 TABLET, FILM COATED ORAL EVERY 6 HOURS PRN
Qty: 40 TABLET | Refills: 1 | Status: SHIPPED | OUTPATIENT
Start: 2023-03-25

## 2023-03-25 RX ORDER — METHYLERGONOVINE MALEATE 0.2 MG/ML
200 INJECTION INTRAVENOUS
Status: DISCONTINUED | OUTPATIENT
Start: 2023-03-25 | End: 2023-03-27 | Stop reason: HOSPADM

## 2023-03-25 RX ORDER — ACETAMINOPHEN 325 MG/1
650 TABLET ORAL EVERY 4 HOURS PRN
Status: DISCONTINUED | OUTPATIENT
Start: 2023-03-25 | End: 2023-03-27 | Stop reason: HOSPADM

## 2023-03-25 RX ORDER — IBUPROFEN 800 MG/1
800 TABLET, FILM COATED ORAL EVERY 6 HOURS PRN
Status: DISCONTINUED | OUTPATIENT
Start: 2023-03-25 | End: 2023-03-27 | Stop reason: HOSPADM

## 2023-03-25 RX ORDER — LABETALOL 100 MG/1
100 TABLET, FILM COATED ORAL EVERY 12 HOURS SCHEDULED
Status: DISCONTINUED | OUTPATIENT
Start: 2023-03-25 | End: 2023-03-27 | Stop reason: HOSPADM

## 2023-03-25 RX ORDER — MAGNESIUM SULFATE HEPTAHYDRATE 40 MG/ML
2 INJECTION, SOLUTION INTRAVENOUS
Status: DISCONTINUED | OUTPATIENT
Start: 2023-03-25 | End: 2023-03-27 | Stop reason: HOSPADM

## 2023-03-25 RX ORDER — OXYTOCIN/0.9 % SODIUM CHLORIDE 30/500 ML
340 PLASTIC BAG, INJECTION (ML) INTRAVENOUS CONTINUOUS PRN
Status: DISCONTINUED | OUTPATIENT
Start: 2023-03-25 | End: 2023-03-27 | Stop reason: HOSPADM

## 2023-03-25 RX ORDER — MODIFIED LANOLIN
OINTMENT (GRAM) TOPICAL
Status: DISCONTINUED | OUTPATIENT
Start: 2023-03-25 | End: 2023-03-27 | Stop reason: HOSPADM

## 2023-03-25 RX ORDER — MODIFIED LANOLIN
OINTMENT (GRAM) TOPICAL
Qty: 7 G | Refills: 3 | Status: SHIPPED | OUTPATIENT
Start: 2023-03-25

## 2023-03-25 RX ADMIN — ACETAMINOPHEN 650 MG: 325 TABLET ORAL at 19:57

## 2023-03-25 RX ADMIN — MAGNESIUM SULFATE HEPTAHYDRATE 4 G: 4 INJECTION, SOLUTION INTRAVENOUS at 02:39

## 2023-03-25 RX ADMIN — MAGNESIUM SULFATE HEPTAHYDRATE 4 G: 40 INJECTION, SOLUTION INTRAVENOUS at 02:39

## 2023-03-25 RX ADMIN — SODIUM CHLORIDE, POTASSIUM CHLORIDE, SODIUM LACTATE AND CALCIUM CHLORIDE 10 ML/HR: 600; 310; 30; 20 INJECTION, SOLUTION INTRAVENOUS at 02:39

## 2023-03-25 RX ADMIN — MAGNESIUM SULFATE HEPTAHYDRATE 2 G/HR: 40 INJECTION, SOLUTION INTRAVENOUS at 23:43

## 2023-03-25 RX ADMIN — ACETAMINOPHEN 650 MG: 325 TABLET ORAL at 16:11

## 2023-03-25 RX ADMIN — LABETALOL HYDROCHLORIDE 100 MG: 100 TABLET, FILM COATED ORAL at 19:57

## 2023-03-25 RX ADMIN — MAGNESIUM SULFATE HEPTAHYDRATE 2 G/HR: 40 INJECTION, SOLUTION INTRAVENOUS at 03:20

## 2023-03-25 RX ADMIN — DULOXETINE HYDROCHLORIDE 60 MG: 60 CAPSULE, DELAYED RELEASE ORAL at 18:00

## 2023-03-25 RX ADMIN — LABETALOL HYDROCHLORIDE 100 MG: 100 TABLET, FILM COATED ORAL at 07:58

## 2023-03-25 ASSESSMENT — ACTIVITIES OF DAILY LIVING (ADL)
WEAR_GLASSES_OR_BLIND: NO
CONCENTRATING,_REMEMBERING_OR_MAKING_DECISIONS_DIFFICULTY: NO
DRESSING/BATHING_DIFFICULTY: NO
ADLS_ACUITY_SCORE: 18
ADLS_ACUITY_SCORE: 18
TOILETING_ISSUES: NO
FALL_HISTORY_WITHIN_LAST_SIX_MONTHS: NO
DOING_ERRANDS_INDEPENDENTLY_DIFFICULTY: NO
DIFFICULTY_EATING/SWALLOWING: NO
ADLS_ACUITY_SCORE: 18
ADLS_ACUITY_SCORE: 31
ADLS_ACUITY_SCORE: 18
CHANGE_IN_FUNCTIONAL_STATUS_SINCE_ONSET_OF_CURRENT_ILLNESS/INJURY: NO
WALKING_OR_CLIMBING_STAIRS_DIFFICULTY: NO
ADLS_ACUITY_SCORE: 18

## 2023-03-25 NOTE — L&D DELIVERY NOTE
Called to room as in-house physician due to unmedicated patient with rapid progress in active phase. Upon my arrival, RN stated SVE 9cm, unclear ROM. Patient had strong urge to push and fetal head was visible at +3 station. Patient pushed through delivery of viable infant male within 5 minutes of my arrival. I was present and delivered anterior/left shoulder without complication after McRobert's maneuver. Delayed cord clamping was performed for 60 seconds. After cord clamp, active management of third stage was performed due to history of postpartum hemorrhage and minimal uterine tone. The placenta delivered intact, with 3VC.  IV oxytocin and Hemabate 0.25mg was administered. Dr. Drake then arrived to assume care.     Laura Gayle MD    Alerted to pt's admission, and laboring status. While I was en route to the hospital, the patient's labor rapidly intensified, and the in-house provider was called. As above, she was present for delivery. Upon my arrival, the baby was stable, placenta was delivered. I assumed care, and assessed blood loss, stated at 300mL prior to my arrival. A second degree laceration was repaired in the standard fashion, using 3.0 vicryl. Mild uterine atony was noted, and hemabate x1, pitocin, and cytotec 800mcg PA were given. EBL approximately 500mL.    Heena Drake MD on 3/25/2023 at 12:27 AM

## 2023-03-25 NOTE — H&P
"  2023    Jessica Ponce  [id number]      OB Admit History & Physical      Ms. Ponce  is here in active labor, with SROM.She was in triage this morning, noting irregular contractions, and light bleeding. She was monitored for 2-3 hours, without cervical change, and was discharged home. She noted a gush of fluid this evening, with increase in contractions thereafter. On admission, she had made change from /-3 to /-1. She was admitted for expectant management.    Patient's last menstrual period was 2022.   Her Estimated Date of Delivery: 2023  , making her 38w3d  wks.      Estimated body mass index is 32.24 kg/m  as calculated from the following:    Height as of 3/21/23: 1.6 m (5' 3\").    Weight as of 3/21/23: 82.6 kg (182 lb).  Her prenatal course has been complicated by Rh negative status, hx PPH, MDD/AZAM (cymbalta), class 1 obesity.    See prenatal for labs.  neg GBBS, Rubella Immune, RH NEGATIVE    Estimated fetal weight= 3100gm       She is a 27 year old   Her OB history:   OB History    Para Term  AB Living   2 1 1 0 0 1   SAB IAB Ectopic Multiple Live Births   0 0 0 0 1      # Outcome Date GA Lbr Reji/2nd Weight Sex Delivery Anes PTL Lv   2 Current            1 Term 21 39w1d 05:21 / 00:35 2.95 kg (6 lb 8.1 oz) M , Vag Va EPI N GEMA      Complications: Fetal Intolerance      Name: MILI PONCE-JESSICA      Apgar1: 7  Apgar5: 8            Past Medical History:   Diagnosis Date     Depressive disorder      MRSA (methicillin resistant staph aureus) culture positive 2006    Knee        No past surgical history on file.      No current outpatient medications on file.       Allergies: Patient has no known allergies.      REVIEW OF SYSTEMS:  NEUROLOGIC:  Negative  EYES:  Negative  ENT:  Negative  GI:  Negative  BREAST:  Negative  :  Negative  GYN:  Negative  CV:  Negative  PULMONARY:  Negative  MUSCULOSKELETAL:  Negative  PSYCH:  Negative        Social History "     Socioeconomic History     Marital status: Single     Spouse name: Not on file     Number of children: Not on file     Years of education: Not on file     Highest education level: Not on file   Occupational History     Not on file   Tobacco Use     Smoking status: Former     Smokeless tobacco: Never   Substance and Sexual Activity     Alcohol use: Not Currently     Drug use: No     Sexual activity: Yes     Partners: Male   Other Topics Concern     Not on file   Social History Narrative     Not on file     Social Determinants of Health     Financial Resource Strain: Not on file   Food Insecurity: Not on file   Transportation Needs: Not on file   Physical Activity: Not on file   Stress: Not on file   Social Connections: Not on file   Intimate Partner Violence: Not on file   Housing Stability: Not on file      No family history on file.          Vitals:   FHT category 1  With contractions every  3min    Alert Awake in NAD  HEENT grossly normal  Neck: no lymphadenopathy or thryoidomegaly  Lungs CTAB  Back no spinal or CVAT  Heart RRR  ABD gravid, nontender on exam with vertex palpable  Pelvic:  clear fluid noted, no blood noted  Cervix is 4 cm / 95 % effaced at -1 station  EXT:  no edema or calf tenderness  Neuro:  Grossly intact    Assessment/Plan: Mrs. Hazel Ponce is a  with SIUP 38w2, admitted in active labor with SROM    Prenatal Care:  - OB labs reviewed: B Negative, Rubella immune, Heb B Ag non-reactive, HIV negative, RPR negative  - Genetics: NIPS neg  - Anatomy ultrasound: normal level 2 with echo  - Rh negative, Rhogam given 1/10/23  - 2nd trim labs wnl  - Declines flu and COVID, Tdap given 1/10  - GBS negative  - Feed: Formula   - Contraception: ZACK pills  - Continue taking prenatal vitamins    Class 1 obesity  - Reviewed recommended weight gain  - TWG -7lbs, rec 11-20lbs    MDD/AZAM  - Continue cymbalta  - Not currently seeing a therapist    H/o VAVD  H/o PPH  -  mL  - Consider prophylactic  medication at time of delivery    Heena Drake MD  Dept of OB/GYN  March 25, 2023

## 2023-03-25 NOTE — DISCHARGE SUMMARY
[facility] Discharge Summary    Hazel Ponce MRN# [id number]   Age: 27 year old YOB: 1995     Date of Admission:  3/24/2023  Date of Discharge::  3/27/2023  Admitting Physician:  Heena Drake MD  Discharge Physician:  Abigail Gracia MD     Home clinic: [facility]          Admission Diagnoses:   Encounter for triage in pregnant patient [Z36.89]  Labor and delivery indication for care or intervention [O75.9]  Active labor  SROM  Rh negative, s/p Rhogam  MDD/AZAM (Celexa)  Class 1 obesity  Hx PPH          Discharge Diagnosis:   Normal spontaneous vaginal delivery  Intrauterine pregnancy at 38 weeks gestation  Precipitous delivery  Uterine atony  Preeclampsia with severe features          Procedures:   Procedure(s):   Repair of second degree perineal laceration  IV magnesium sulfate seizure prophylaxis  Titration of antihypertensives       No other procedures performed during this admission           Medications Prior to Admission:     Medications Prior to Admission   Medication Sig Dispense Refill Last Dose     acetaminophen (TYLENOL) 325 MG tablet Take 2 tablets (650 mg) by mouth every 4 hours as needed for mild pain or fever        cholecalciferol 50 MCG (2000 UT) CAPS Take 2,000 Units by mouth daily        DULoxetine (CYMBALTA) 20 MG capsule TAKE 1 CAPSULE BY MOUTH DAILY. TAKE WITH 60MG CAPSULE. APPT NEEDED FOR FURTHER REFILLS        DULoxetine (CYMBALTA) 60 MG capsule Take 60 mg by mouth daily        hydrOXYzine (ATARAX) 25 MG tablet Take 2-4 tablets ( mg) by mouth every 8 hours as needed for itching or other (Pain) 30 tablet 1      oxyCODONE (ROXICODONE) 5 MG tablet TAKE 1 TABLET (5 MG) BY MOUTH EVERY 4 HOURS AS NEEDED FOR PAIN.        polyethylene glycol (MIRALAX) 17 GM/Dose powder Take 17 g by mouth daily 510 g 0      Prenatal Vit-Fe Fumarate-FA (PRENATAL PLUS) 27-1 MG TABS Take 1 tablet by mouth daily        senna-docusate (SENOKOT-S/PERICOLACE)  8.6-50 MG tablet Take 1 tablet by mouth 2 times daily as needed for constipation 10 tablet 1      SENNA-docusate sodium (SENNA S) 8.6-50 MG tablet Take 1 tablet by mouth 2 times daily as needed (constipation) 30 tablet 0              Discharge Medications:     Current Discharge Medication List      START taking these medications    Details   benzocaine (AMERICAINE) 20 % external aerosol Apply to perineum four times daily as needed for pain  Qty: 57 g, Refills: 0    Associated Diagnoses: Labor and delivery indication for care or intervention      docusate sodium (COLACE) 100 MG capsule Take 1 capsule (100 mg) by mouth daily  Qty: 30 capsule, Refills: 0    Associated Diagnoses: Labor and delivery indication for care or intervention      hydrocortisone, Perianal, (ANUSOL-HC) 2.5 % cream Place rectally 3 times daily as needed for hemorrhoids  Qty: 30 g, Refills: 0    Associated Diagnoses: Labor and delivery indication for care or intervention      ibuprofen (ADVIL/MOTRIN) 800 MG tablet Take 1 tablet (800 mg) by mouth every 6 hours as needed for other (cramping)  Qty: 40 tablet, Refills: 1    Associated Diagnoses: Labor and delivery indication for care or intervention      lanolin ointment Apply topically every hour as needed for other (sore nipples)  Qty: 7 g, Refills: 3    Associated Diagnoses: Labor and delivery indication for care or intervention         CONTINUE these medications which have NOT CHANGED    Details   acetaminophen (TYLENOL) 325 MG tablet Take 2 tablets (650 mg) by mouth every 4 hours as needed for mild pain or fever    Associated Diagnoses: Nephrolithiasis      cholecalciferol 50 MCG (2000 UT) CAPS Take 2,000 Units by mouth daily      !! DULoxetine (CYMBALTA) 20 MG capsule TAKE 1 CAPSULE BY MOUTH DAILY. TAKE WITH 60MG CAPSULE. APPT NEEDED FOR FURTHER REFILLS      !! DULoxetine (CYMBALTA) 60 MG capsule Take 60 mg by mouth daily      hydrOXYzine (ATARAX) 25 MG tablet Take 2-4 tablets ( mg) by  mouth every 8 hours as needed for itching or other (Pain)  Qty: 30 tablet, Refills: 1    Associated Diagnoses: Kidney stones; Nephrolithiasis      oxyCODONE (ROXICODONE) 5 MG tablet TAKE 1 TABLET (5 MG) BY MOUTH EVERY 4 HOURS AS NEEDED FOR PAIN.      polyethylene glycol (MIRALAX) 17 GM/Dose powder Take 17 g by mouth daily  Qty: 510 g, Refills: 0    Associated Diagnoses: Nephrolithiasis      Prenatal Vit-Fe Fumarate-FA (PRENATAL PLUS) 27-1 MG TABS Take 1 tablet by mouth daily      !! senna-docusate (SENOKOT-S/PERICOLACE) 8.6-50 MG tablet Take 1 tablet by mouth 2 times daily as needed for constipation  Qty: 10 tablet, Refills: 1    Associated Diagnoses: Vacuum-assisted vaginal delivery      !! SENNA-docusate sodium (SENNA S) 8.6-50 MG tablet Take 1 tablet by mouth 2 times daily as needed (constipation)  Qty: 30 tablet, Refills: 0    Associated Diagnoses: Nephrolithiasis       !! - Potential duplicate medications found. Please discuss with provider.                Consultations:   No consultations were requested during this admission          Brief History of Labor:   Called to room as in-house physician due to unmedicated patient with rapid progress in active phase. Upon my arrival, RN stated SVE 9cm, unclear ROM. Patient had strong urge to push and fetal head was visible at +3 station. Patient pushed through delivery of viable infant male within 5 minutes of my arrival. I was present and delivered anterior/left shoulder without complication after McRobert's maneuver. Delayed cord clamping was performed for 60 seconds. After cord clamp, active management of third stage was performed due to history of postpartum hemorrhage and minimal uterine tone. The placenta delivered intact, with 3VC.  IV oxytocin and Hemabate 0.25mg was administered. Dr. Drake then arrived to assume care.     Laura Gayle MD    Alerted to pt's admission, and laboring status. While I was en route to the hospital, the patient's labor  rapidly intensified, and the in-house provider was called. As above, she was present for delivery. Upon my arrival, the baby was stable, placenta was delivered. I assumed care, and assessed blood loss, stated at 300mL prior to my arrival. A second degree laceration was repaired in the standard fashion, using 3.0 vicryl. Mild uterine atony was noted, and hemabate x1, pitocin, and cytotec 800mcg MO were given. EBL approximately 500mL.    Heena Drake MD on 3/25/2023 at 12:27 AM           Hospital Course:   The patient's hospital course was unremarkable.  On discharge, her pain was well controlled. Vaginal bleeding is similar to peak menstrual flow.  Voiding without difficulty.  Ambulating well and tolerating a normal diet.  No fever.  Formula feeding well.  Infant is stable.  No bowel movement yet.  She was discharged on post-partum day #3.    - Pre-E with SF:          - s/p Mag x24hr PP          - Cont Labetalol 100mg BID          - Transaminitis: LFTs not yet trending down.  PE unremarkable.  Hepatitis panel ordered (added on to existing specimen). Will repeat CBC and CMP at her early PP visit.  Discontinue Tylenol.  - Anx, Dep: Mood stable, no SI/HI.  Cont Cymbalta.        Post-partum hemoglobin:   Hemoglobin   Date Value Ref Range Status   01/27/2023 12.2 11.7 - 15.7 g/dL Final   05/29/2021 10.0 (L) 11.7 - 15.7 g/dL Final             Discharge Instructions and Follow-Up:   Discharge diet: Regular   Discharge activity: No driving or operating machinery while on narcotic analgesics  Pelvic rest: abstain from intercourse and do not use tampons for 6 week(s)   Discharge follow-up: Follow up with primary care provider in 4-5 days   Wound care: Drink plenty of fluids  Ice to area for comfort           Discharge Disposition:   Discharged to home      Jose ROBERSON

## 2023-03-25 NOTE — PROVIDER NOTIFICATION
03/24/23 2227   Provider Notification   Provider Name/Title Dr. Drake   Method of Notification Phone   Request Evaluate - Remote   Notification Reason Maternal Vital Sign Change     Physician informed of severe range blood pressure x 1. Orders received for the pre eclampsia protocol with labetalol. Telephone orders read back and verified. Will inform pt of plan of care.Dione Bueno RN on 3/25/2023 at 3:13 AM

## 2023-03-25 NOTE — PROVIDER NOTIFICATION
03/24/23 2211   Provider Notification   Provider Name/Title Dr. Drkae   Method of Notification Phone   Request Evaluate - Remote   Notification Reason Patient Arrived   Physician informed of pt's arrival and current maternal and fetal condition: farrah every 3 - 7 minutes per pt's report; however, very difficult to monitor via toco and palpating mild to moderate. Cervix dilated to 4/95/-2/mid/soft. No obvious fluid on glove after cervical exam. No pooling with speculum exam. Fern obtained and pending. Of note, pt was seen in triage this morning for vaginal bleeding. No vaginal bleeding since. Brown discharge noted on glove. Good fetal movement. Cat 2 fetal heart rate tracing. See flow sheet for details. Mid range blood pressures. No other sign/symptoms of pre eclampsia. Orders received:  - Admit for labor   - Intrapartum orders  - Pre eclampsia labs  Telephone orders read back and verified. Will inform pt of plan of care. Dr. Drake will slowly make her way to the hospital. Dione Bueno, RN on 3/25/2023 at 3:11 AM

## 2023-03-25 NOTE — PROGRESS NOTES
[facility] OB Postpartum Note    S:  Hazel Ponce feels well this morning. Was not able to sleep last night. Pain control adequate. Lochia minimal. Voiding. Formula feeding. Mood Good.     O:  Vitals were reviewed  Blood pressure 133/87, temperature 98.4  F (36.9  C), temperature source Oral, resp. rate 18, last menstrual period 2022, SpO2 100 %, unknown if currently breastfeeding.      General: healthy, alert and no distress  Abd: soft, appropriately tender, fundus firm  Legs: Non-tender, 1+ pitting edema    RH(D)   Date Value Ref Range Status   2021 Neg  Final     Rubella: immune    Assessment and Plan:    Mrs. Hazel Ponce is a 27 year old  female, admitted at 38w3d in labor, with precipitous delivery, along with PreE with SF     PreE with SF  -multiple severe range Bps in labor, mostly mild range  -significant LFT increase (3/24 AST/ALT/plt--88/177/318  -begin magnesium seizure prophylaxis x24h  -135/85 average; labetalol 100mg PO BID added AM 3/25     Class 1 obesity  - Reviewed recommended weight gain  - TWG -7lbs, rec 11-20lbs    MDD/AZAM  - Continue cymbalta  - Not currently seeing a therapist    H/o VAVD  H/o PPH  - QBL 500mL    Heena Drake MD

## 2023-03-25 NOTE — PROGRESS NOTES
Data: Hazel Ponce transferred to [id number] via wheelchair at 0315. Baby transferred via parent's arms.  Action: Receiving unit notified of transfer: Yes. Patient and family notified of room change. Recovery RN assumed PP cares. Belongings sent to receiving unit. Accompanied by Registered Nurse. Oriented patient to surroundings. Call light within reach. ID bands double-checked with receiving RN.  Response: Patient tolerated transfer and is stable.

## 2023-03-25 NOTE — PROVIDER NOTIFICATION
03/25/23 1700   Provider Notification   Provider Name/Title Dr. Garcia   Method of Notification Phone   Request Evaluate-Remote   Notification Reason Medication Request     MD paged for pt request for cymbalta. Order entered.

## 2023-03-25 NOTE — PROVIDER NOTIFICATION
03/24/23 5986   Provider Notification   Provider Name/Title Dr. Drake   Method of Notification Phone   Request Attend Delivery   Discuss with MD that SVE is 9/90/0. Pt was hoping for epidural, but is currently feeling pushy. Severe BP x2 with last /102. Dione, primary RN is about to give prn labetalol.     MD states she is in route now and ETA is in about 9 minutes.

## 2023-03-25 NOTE — PROVIDER NOTIFICATION
03/25/23 0133   Provider Notification   Provider Name/Title Dr. Drake   Method of Notification Phone   Request Evaluate - Remote   Notification Reason Lab/Diagnostic Study     Physician informed of elevated ALT, AST, and Cr and mild range blood pressures. Physician will come to bedside to discuss recommended plan of care. Dione Bueno RN on 3/25/2023 at 4:30 AM

## 2023-03-25 NOTE — PROGRESS NOTES
LABOR NOTE    Subjective: BP remained mild range through most of labor, with several severe range as well. Labetalol unable to be given due to rapid evolution of labor, and BP out of severe range since delivery. HELLP labs drawn, due to Bps:    AST/ALT/plt--88/177/318     Elevated BP with impaired liver function diagnostic for preeclampsia with severe features. Discussed this with pt and ; the unknown etiology, pathophysiology behind end organ effects, risk of intravascular depletion with simultaneous volume overload. Reviewed risks/benefits/alternatives/side effects of magnesium. Questions answered. Pt agreeable to magnesium seizure prophylaxis.    Objective:  /79   Temp 97.8  F (36.6  C)   Resp 12   LMP 2022   Breastfeeding No    Gen: AAOx3, NAD, no headaches, visual changes  Resp: CTAB  Cardio: RRR  Abd: uterus firm at U  Ext: no C/C/E  Neuro: reflexes 2/4, no clonus.    Assessment and Plan: Mrs. Hazel Ponce is a 27 year old  female, admitted at 38w3d in labor, with precipitous delivery    PreE with SF  -multiple severe range Bps in labor, mostly mild range  -significant LFT increase (3/24 AST/ALT/plt--88/177/318  -begin magnesium seizure prophylaxis x24h    Class 1 obesity  - Reviewed recommended weight gain  - TWG -7lbs, rec 11-20lbs    MDD/AZAM  - Continue cymbalta  - Not currently seeing a therapist    H/o VAVD  H/o PPH  - QBL 500mL    Heena Drake MD

## 2023-03-26 ENCOUNTER — HEALTH MAINTENANCE LETTER (OUTPATIENT)
Age: 28
End: 2023-03-26

## 2023-03-26 LAB
ALBUMIN SERPL BCG-MCNC: 3 G/DL (ref 3.5–5.2)
ALP SERPL-CCNC: 167 U/L (ref 35–104)
ALT SERPL W P-5'-P-CCNC: 126 U/L (ref 10–35)
ANION GAP SERPL CALCULATED.3IONS-SCNC: 10 MMOL/L (ref 7–15)
AST SERPL W P-5'-P-CCNC: 54 U/L (ref 10–35)
BILIRUB SERPL-MCNC: 0.4 MG/DL
BUN SERPL-MCNC: 12.5 MG/DL (ref 6–20)
CALCIUM SERPL-MCNC: 6.9 MG/DL (ref 8.6–10)
CHLORIDE SERPL-SCNC: 104 MMOL/L (ref 98–107)
CREAT SERPL-MCNC: 0.63 MG/DL (ref 0.51–0.95)
DEPRECATED HCO3 PLAS-SCNC: 24 MMOL/L (ref 22–29)
ERYTHROCYTE [DISTWIDTH] IN BLOOD BY AUTOMATED COUNT: 17.1 % (ref 10–15)
GFR SERPL CREATININE-BSD FRML MDRD: >90 ML/MIN/1.73M2
GLUCOSE SERPL-MCNC: 106 MG/DL (ref 70–99)
HCT VFR BLD AUTO: 28 % (ref 35–47)
HGB BLD-MCNC: 8.9 G/DL (ref 11.7–15.7)
MCH RBC QN AUTO: 27.5 PG (ref 26.5–33)
MCHC RBC AUTO-ENTMCNC: 31.8 G/DL (ref 31.5–36.5)
MCV RBC AUTO: 86 FL (ref 78–100)
PLATELET # BLD AUTO: 267 10E3/UL (ref 150–450)
POTASSIUM SERPL-SCNC: 3.9 MMOL/L (ref 3.4–5.3)
PROT SERPL-MCNC: 6.3 G/DL (ref 6.4–8.3)
RBC # BLD AUTO: 3.24 10E6/UL (ref 3.8–5.2)
SODIUM SERPL-SCNC: 138 MMOL/L (ref 136–145)
WBC # BLD AUTO: 9.6 10E3/UL (ref 4–11)

## 2023-03-26 PROCEDURE — 85027 COMPLETE CBC AUTOMATED: CPT | Performed by: OBSTETRICS & GYNECOLOGY

## 2023-03-26 PROCEDURE — 36415 COLL VENOUS BLD VENIPUNCTURE: CPT | Performed by: OBSTETRICS & GYNECOLOGY

## 2023-03-26 PROCEDURE — 80053 COMPREHEN METABOLIC PANEL: CPT | Performed by: OBSTETRICS & GYNECOLOGY

## 2023-03-26 PROCEDURE — 250N000013 HC RX MED GY IP 250 OP 250 PS 637: Performed by: OBSTETRICS & GYNECOLOGY

## 2023-03-26 PROCEDURE — 250N000011 HC RX IP 250 OP 636: Performed by: OBSTETRICS & GYNECOLOGY

## 2023-03-26 PROCEDURE — 120N000001 HC R&B MED SURG/OB

## 2023-03-26 RX ORDER — LABETALOL 100 MG/1
100 TABLET, FILM COATED ORAL EVERY 12 HOURS
Qty: 60 TABLET | Refills: 1 | Status: SHIPPED | OUTPATIENT
Start: 2023-03-26

## 2023-03-26 RX ADMIN — HUMAN RHO(D) IMMUNE GLOBULIN 300 MCG: 1500 SOLUTION INTRAMUSCULAR; INTRAVENOUS at 02:43

## 2023-03-26 RX ADMIN — ACETAMINOPHEN 650 MG: 325 TABLET ORAL at 00:23

## 2023-03-26 RX ADMIN — IBUPROFEN 800 MG: 800 TABLET, FILM COATED ORAL at 17:57

## 2023-03-26 RX ADMIN — LABETALOL HYDROCHLORIDE 100 MG: 100 TABLET, FILM COATED ORAL at 08:29

## 2023-03-26 RX ADMIN — IBUPROFEN 800 MG: 800 TABLET, FILM COATED ORAL at 04:10

## 2023-03-26 RX ADMIN — DULOXETINE HYDROCHLORIDE 60 MG: 60 CAPSULE, DELAYED RELEASE ORAL at 09:08

## 2023-03-26 RX ADMIN — LABETALOL HYDROCHLORIDE 100 MG: 100 TABLET, FILM COATED ORAL at 22:19

## 2023-03-26 RX ADMIN — ACETAMINOPHEN 650 MG: 325 TABLET ORAL at 04:10

## 2023-03-26 RX ADMIN — ACETAMINOPHEN 650 MG: 325 TABLET ORAL at 17:57

## 2023-03-26 RX ADMIN — ACETAMINOPHEN 650 MG: 325 TABLET ORAL at 22:19

## 2023-03-26 ASSESSMENT — ACTIVITIES OF DAILY LIVING (ADL)
ADLS_ACUITY_SCORE: 18

## 2023-03-26 NOTE — PROVIDER NOTIFICATION
03/25/23 2115   Provider Notification   Provider Name/Title Dr Garcia   Method of Notification Phone   Request Evaluate-Remote     MD updated on current blood pressure frequency orders and order reads to discontinue Magnesium at 0230 on 3/26. MD would like blood pressures Q2 hr while awake. If severe range blood pressure page MD immediately. If 140/90 or above for four readings, two hours apart for a total of 8 hours page MD to update.

## 2023-03-26 NOTE — PROGRESS NOTES
[facility] OB Postpartum Note    S:  Hazel Ponce feels ready to go home this morning. Was able to sleep last night. Pain control adequate. Lochia minimal. Voiding. Formula feeding. Mood Fair.     Explained transaminitis, need for labs to continue normalizing. Magnesium just discontinued at 0300, <12h ago. Advised pt to stay for monitoring one more night. She is frustrated, tearful, but agrees.    O:  Vitals were reviewed  Blood pressure 117/79, pulse 86, temperature 98  F (36.7  C), temperature source Oral, resp. rate 16, weight 70.8 kg (156 lb 1.6 oz), last menstrual period 2022, SpO2 99 %, unknown if currently breastfeeding.      General: healthy, alert and no distress  Abd: soft, appropriately tender, fundus firm  Legs: Non-tender, 0+ pitting edema    RH(D)   Date Value Ref Range Status   2021 Neg  Final     Rubella: immune    Assessment and Plan:   Postpartum Day #2, status post vaginal delivery, doing well.  -- Routine care  -- F/U 6 weeks w/ Primary OB    Assessment and Plan:    Mrs. Hazel Ponce is a 27 year old  female, admitted at 38w3d in labor, with precipitous delivery, along with PreE with SF     PreE with SF  -multiple severe range Bps in labor, mostly mild range  -transaminitis (3/24 AST/ALT/plt--88/177/318>54/126/267). Daily labs  -s/p magnesium seizure prophylaxis x24h  -now normotensive; labetalol 100mg PO BID added AM 3/25     Class 1 obesity  - s/p ASA    MDD/AZAM  - Continue cymbalta  - Not currently seeing a therapist    H/o VAVD  H/o PPH  - QBL 500mL  -ALONDRA with SI ABLA-declines IV iron, stable, no symptoms at 8.9    Heena Drake MD

## 2023-03-26 NOTE — PROVIDER NOTIFICATION
03/26/23 0829   Provider Notification   Provider Name/Title Dr Drake   Method of Notification Phone   Request Evaluate-Remote   Notification Reason Lab Results;Status Update     Notified MD of Hgb 8.9, no s/s and /79. Also informed MD about pt's repeated requests to discharge today. MD will discuss POC with pt during rounds today at 12noon. Pt updated.

## 2023-03-26 NOTE — PROVIDER NOTIFICATION
03/26/23 1537   Provider Notification   Provider Name/Title Dr. Drake   Method of Notification Phone   Notification Reason Other  (labs for tomorrow)     Clarified if MD wanted labs drawn tomorrow morning, nothing ordered at this time.     Telephone order read back for CMP and CBC to be drawn 3/27/23 am.

## 2023-03-27 VITALS
DIASTOLIC BLOOD PRESSURE: 87 MMHG | BODY MASS INDEX: 30.24 KG/M2 | OXYGEN SATURATION: 99 % | TEMPERATURE: 98.7 F | SYSTOLIC BLOOD PRESSURE: 125 MMHG | RESPIRATION RATE: 18 BRPM | WEIGHT: 170.7 LBS | HEART RATE: 83 BPM

## 2023-03-27 LAB
ALBUMIN SERPL BCG-MCNC: 3.2 G/DL (ref 3.5–5.2)
ALP SERPL-CCNC: 146 U/L (ref 35–104)
ALT SERPL W P-5'-P-CCNC: 152 U/L (ref 10–35)
ANION GAP SERPL CALCULATED.3IONS-SCNC: 9 MMOL/L (ref 7–15)
AST SERPL W P-5'-P-CCNC: 72 U/L (ref 10–35)
BILIRUB SERPL-MCNC: 0.4 MG/DL
BUN SERPL-MCNC: 10.6 MG/DL (ref 6–20)
CALCIUM SERPL-MCNC: 8.3 MG/DL (ref 8.6–10)
CHLORIDE SERPL-SCNC: 107 MMOL/L (ref 98–107)
CREAT SERPL-MCNC: 0.58 MG/DL (ref 0.51–0.95)
DEPRECATED HCO3 PLAS-SCNC: 24 MMOL/L (ref 22–29)
ERYTHROCYTE [DISTWIDTH] IN BLOOD BY AUTOMATED COUNT: 17.2 % (ref 10–15)
GFR SERPL CREATININE-BSD FRML MDRD: >90 ML/MIN/1.73M2
GLUCOSE SERPL-MCNC: 81 MG/DL (ref 70–99)
HBV SURFACE AG SERPL QL IA: NONREACTIVE
HCT VFR BLD AUTO: 30.6 % (ref 35–47)
HCV AB SERPL QL IA: NONREACTIVE
HGB BLD-MCNC: 9.6 G/DL (ref 11.7–15.7)
MCH RBC QN AUTO: 27.4 PG (ref 26.5–33)
MCHC RBC AUTO-ENTMCNC: 31.4 G/DL (ref 31.5–36.5)
MCV RBC AUTO: 87 FL (ref 78–100)
PLATELET # BLD AUTO: 300 10E3/UL (ref 150–450)
POTASSIUM SERPL-SCNC: 4.2 MMOL/L (ref 3.4–5.3)
PROT SERPL-MCNC: 6.4 G/DL (ref 6.4–8.3)
RBC # BLD AUTO: 3.5 10E6/UL (ref 3.8–5.2)
SODIUM SERPL-SCNC: 140 MMOL/L (ref 136–145)
WBC # BLD AUTO: 9.1 10E3/UL (ref 4–11)

## 2023-03-27 PROCEDURE — 36415 COLL VENOUS BLD VENIPUNCTURE: CPT | Performed by: OBSTETRICS & GYNECOLOGY

## 2023-03-27 PROCEDURE — 87340 HEPATITIS B SURFACE AG IA: CPT | Performed by: OBSTETRICS & GYNECOLOGY

## 2023-03-27 PROCEDURE — 80053 COMPREHEN METABOLIC PANEL: CPT | Performed by: OBSTETRICS & GYNECOLOGY

## 2023-03-27 PROCEDURE — 85027 COMPLETE CBC AUTOMATED: CPT | Performed by: OBSTETRICS & GYNECOLOGY

## 2023-03-27 PROCEDURE — 250N000013 HC RX MED GY IP 250 OP 250 PS 637: Performed by: OBSTETRICS & GYNECOLOGY

## 2023-03-27 PROCEDURE — 86803 HEPATITIS C AB TEST: CPT | Performed by: OBSTETRICS & GYNECOLOGY

## 2023-03-27 RX ORDER — OXYCODONE HYDROCHLORIDE 5 MG/1
5 TABLET ORAL EVERY 6 HOURS PRN
Qty: 6 TABLET | Refills: 0 | Status: SHIPPED | OUTPATIENT
Start: 2023-03-27

## 2023-03-27 RX ORDER — ADHESIVE BANDAGE 3/4"
BANDAGE TOPICAL
Qty: 1 EACH | Refills: 0 | Status: SHIPPED | OUTPATIENT
Start: 2023-03-27

## 2023-03-27 RX ADMIN — LABETALOL HYDROCHLORIDE 100 MG: 100 TABLET, FILM COATED ORAL at 08:40

## 2023-03-27 RX ADMIN — DOCUSATE SODIUM 100 MG: 100 CAPSULE, LIQUID FILLED ORAL at 08:40

## 2023-03-27 RX ADMIN — DULOXETINE HYDROCHLORIDE 60 MG: 60 CAPSULE, DELAYED RELEASE ORAL at 08:40

## 2023-03-27 ASSESSMENT — ACTIVITIES OF DAILY LIVING (ADL)
ADLS_ACUITY_SCORE: 18

## 2023-03-27 NOTE — PROVIDER NOTIFICATION
03/26/23 1930   Provider Notification   Provider Name/Title [facility]   Method of Notification Phone     Spoke with Dr Drake. Order received to discontinue strict I&O's and to check BP Q4 instead of Q2

## 2023-03-27 NOTE — PLAN OF CARE
"Delay in blood pressure reassessment d/t IV insertion and movement/position d/t severe pain - hoping pain and blood pressure would improve after fentanyl administration in light of no history or risk factors for pre eclampsia. Labetalol requested to room for severe range pressures after no improvement was noted. Waiting for MDA for epidural insertion; however, pt states she feels like she has to poop. Cervix checked - 9/100/0. Pt feels like \"baby is coming.\" In house requested to bedside. Dione Bueno RN on 3/25/2023 at 3:23 AM  "
BPs stable, no other s/s. Pt reports feeling fine. PIH labs improving but still not WDL. Mag d/c'd this AM at 0230. Plan is for pt to stay for at least 24hr BP monitoring. Pt initially upset about staying but also understands the reasoning. Plan for repeat labs in AM. Bottlefeeding.   
Bladder scanned for 200 ml. Drinking fluids. Will attempt to void again soon. Dione Bueno RN on 3/25/2023 at 4:26 AM    
Data: [facility]: 3/24/2023 8:53 PM. Reason for maternal/fetal assessment is leaking vaginal fluid. Patient reports she was cleaning the house and had 2 gushes of fluid in her underwear. It was clear and without odor. She has been farrah for over a week, but more consistently this morning. The contractions have become stronger and closer together since the gush of fluid. Patient is a .  Prenatal record reviewed. Pregnancy  has been complicated by kidney stones.   Gestational Age 38w3d. VSS. Fetal movement active. Patient denies vaginal bleeding, abdominal pain, nausea, vomiting, headache, visual disturbances, epigastric or URQ pain, significant edema. Support person is present.   Action: Verbal consent for EFM. Triage assessment completed. Bill of rights reviewed.  Response: Patient verbalized agreement with plan. Will contact Dr Heena Drake with update and for further orders. Dione Bueno RN on 3/25/2023 at 2:19 AM      
Data: VSS. Postpartum checks within normal limits - see flow record. Patient is eating and drinking normally, voiding well, and ambulating assist of 1 due to mag infusion. Reflexes WNL, no clonus. Patient is formula feeding  every 2-3 hours. Lac 2nd degree WNL, using Ice pack for discomfort.   Action: Patient Not taking pain medication during the shift for Patient education done, see flow record.  Response: Patient is bonding well with baby. father at bedside, supportive.  Plan: Continue current plan of care. CBC in AM, labetalol 100 mg BID   Postpartum medications discussed  Anticipate discharge in 2-3 days pending vital signs and labs.  
Data: Vital signs within normal limits./87 Postpartum checks within normal limits - see flow record. Patient eating and drinking normally. Patient able to empty bladder independently and is up ambulating. No apparent signs of infection.  Patient performing self cares and is able to care for infant.EPDS score 0.   Action: Patient not  medicated during the shift for pain.   - patient stated she was comfortable. Patient education done about . See flow record.  Response: Positive attachment behaviors observed with infant. Support persons  present.   Plan: Anticipate discharge today , seen by DR Garcia and may discharge to home today , will follow up in one week for BP check and will monitor BP at home as advised by DR Garcia . Home medications given, BP cuff given. BC and ROP completed. All discharge instructions reviewed and questions answered , left unit at 11.50.  
PIV inserted and fentanyl administered. Epidural requested. Dione Bueno RN on 3/25/2023 at 3:15 AM    
Patient doing well. VSS. Magnesium stopped at 0230 and continuing to monitor I and O's and blood pressure Q2. Bonding well with infant. Bottle feeding. Steady on feet and up ad mayelin. Fundus firm and midline. Rhogam given. Showered independently. Wishes to discharge home today with infant.   
Pt VSS, denies RUQ pain, N/V, vision changes, and headaches. Reflexes WDL - no clonus. Pt voiding spontaneously. Pt formula feeding infant and bonding well with infant. Pt able to nap during the day. Pt denies pain. Report given to Carmel OSULLIVAN. Pt sleeping during report.   
VS and BP's stable on 100 mg Labetolol BID. No clonus and reflexes WNL. Adequate urine output. Fundus firm and midline. Bonding well with infant. Will continue to monitor VS Q4.   
VSS. Denies sign/symptoms of pre eclampsia. See flow sheet. Hand grasps strong and equal bilaterally. Magnesium sulfate infusing per orders. Voids spontaneously. Holding baby and bonding well. Monitor.  
General

## 2023-03-27 NOTE — PROGRESS NOTES
Patient Name:  Hazel Ponce   MRN:  [id number]  Age:  27 year old    YOB: 1995      POSTPARTUM PROGRESS NOTE    Pt is PPD#1 s/p vaginal delivery.  She is doing well without complaints.  Pt is ambulating, voiding, tolerating a regular diet.  Pain is well controlled and lochia is within normal limits.  She is bottle feeding.  Baby is doing well.    Denies headache, vision change, CP, SOB, n/v, upper abdominal pain, or increased swelling.       Objective:    Temp:  [98.6  F (37  C)-98.8  F (37.1  C)] 98.6  F (37  C)  Pulse:  [85] 85  Resp:  [16-18] 18  BP: (122-132)/(68-91) 122/85  170 lbs 11.2 oz    General Appearance:  NAD  Lungs:  unlabored  Cardiovascular:  RRR  Abdomen:  nontender, nondistended  Fundus:  firm, below the umbilicus      Lower extremities:  trace symmetric edema    Lab Review:    ABO/RH(D)   Date Value Ref Range Status   03/25/2023 B NEG  Final     Hemoglobin   Date Value Ref Range Status   03/27/2023 9.6 (L) 11.7 - 15.7 g/dL Final   03/26/2023 8.9 (L) 11.7 - 15.7 g/dL Final   03/25/2023 10.7 (L) 11.7 - 15.7 g/dL Final   05/29/2021 10.0 (L) 11.7 - 15.7 g/dL Final   05/28/2021 12.3 11.7 - 15.7 g/dL Final   02/16/2021 12.7 11.7 - 15.7 g/dL Final     Hematocrit   Date Value Ref Range Status   03/27/2023 30.6 (L) 35.0 - 47.0 % Final   03/26/2023 28.0 (L) 35.0 - 47.0 % Final   03/25/2023 32.5 (L) 35.0 - 47.0 % Final   02/16/2021 39.2 35.0 - 47.0 % Final       Lab Results   Component Value Date    WBC 9.1 03/27/2023    WBC 24.1 02/16/2021     Lab Results   Component Value Date    RBC 3.50 03/27/2023    RBC 4.40 02/16/2021     Lab Results   Component Value Date    HGB 9.6 03/27/2023    HGB 10.0 05/29/2021     Lab Results   Component Value Date    HCT 30.6 03/27/2023    HCT 39.2 02/16/2021     No components found for: MCT  Lab Results   Component Value Date    MCV 87 03/27/2023    MCV 89 02/16/2021     Lab Results   Component Value Date    MCH 27.4 03/27/2023    MCH 28.9 02/16/2021      Lab Results   Component Value Date    MCHC 31.4 2023    MCHC 32.4 2021     Lab Results   Component Value Date    RDW 17.2 2023    RDW 13.5 2021     Lab Results   Component Value Date     2023     2021       Assessment:  26yo  PPD#3 s/p vaginal delivery, doing well.    Plan:   - Postpartum: recovering well. Pain well controlled. Cont PO pain meds and regular diet. Encourage ambulation.  - Pre-E with SF:   - s/p Mag x24hr PP   - Cont Labetalol 100mg BID   - Transaminitis: LFTs not yet trending down.  PE unremarkable.  Hepatitis panel ordered (added on to existing specimen).  If patient discharged today, will repeat CBC and CMP at her early PP visit.  Discontinue Tylenol.  - Anx, Dep: Mood stable, no SI/HI.  Cont Cymbalta.  - Contraception: COCPs  - Dispo: very motivated for DC today.  Reviewed abnormal LFTs, but with normal Bps.  She is able to monitor Bps at home.  Will F/U in 1 week for BP check and labs.       Meredith Chan, MD [facility]  2023

## 2023-03-28 LAB — HAV IGG SER QL IA: NONREACTIVE

## 2023-03-29 ENCOUNTER — PATIENT OUTREACH (OUTPATIENT)
Dept: CARE COORDINATION | Facility: CLINIC | Age: 28
End: 2023-03-29
Payer: COMMERCIAL

## 2023-03-29 NOTE — PROGRESS NOTES
Connected Care Resource Center Contact  [facility]/Voicemail     Clinical Data: Transitional Care Management Outreach     Outreach attempted x 2.  Left message on patient's voicemail, providing [facility]'s 24/7 scheduling and nurse triage phone number [phone] ([phone]) for questions/concerns and/or to schedule an appt with an [facility] provider, if they do not have a PCP.      Plan:  [facility] will do no further outreaches at this time.       Tye Ernandez  [facility], [facility]    *Connected Care Resource Team does NOT follow patient ongoing. Referrals are identified based on internal discharge reports and the outreach is to ensure patient has an understanding of their discharge instructions.

## 2024-05-26 ENCOUNTER — HEALTH MAINTENANCE LETTER (OUTPATIENT)
Age: 29
End: 2024-05-26

## 2025-06-14 ENCOUNTER — HEALTH MAINTENANCE LETTER (OUTPATIENT)
Age: 30
End: 2025-06-14

## (undated) RX ORDER — LIDOCAINE HYDROCHLORIDE AND EPINEPHRINE 10; 10 MG/ML; UG/ML
INJECTION, SOLUTION INFILTRATION; PERINEURAL
Status: DISPENSED
Start: 2021-11-21